# Patient Record
Sex: FEMALE | Race: WHITE | NOT HISPANIC OR LATINO | Employment: STUDENT | URBAN - METROPOLITAN AREA
[De-identification: names, ages, dates, MRNs, and addresses within clinical notes are randomized per-mention and may not be internally consistent; named-entity substitution may affect disease eponyms.]

---
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
## 2021-04-15 ENCOUNTER — TELEPHONE (OUTPATIENT)
Dept: FAMILY MEDICINE CLINIC | Facility: CLINIC | Age: 21
End: 2021-04-15

## 2021-04-15 NOTE — TELEPHONE ENCOUNTER
Lm for patient to return call  Patient last seen in office for PHYS in 2018  Please ask patient if she would like to stay current with the office   Dr Yu Hagan would like her to schedule to stay current

## 2021-06-07 ENCOUNTER — OFFICE VISIT (OUTPATIENT)
Dept: FAMILY MEDICINE CLINIC | Facility: CLINIC | Age: 21
End: 2021-06-07
Payer: COMMERCIAL

## 2021-06-07 VITALS
OXYGEN SATURATION: 98 % | WEIGHT: 208 LBS | BODY MASS INDEX: 34.66 KG/M2 | DIASTOLIC BLOOD PRESSURE: 80 MMHG | RESPIRATION RATE: 18 BRPM | HEART RATE: 84 BPM | SYSTOLIC BLOOD PRESSURE: 120 MMHG | TEMPERATURE: 98.4 F | HEIGHT: 65 IN

## 2021-06-07 DIAGNOSIS — F31.9 BIPOLAR 1 DISORDER (HCC): ICD-10-CM

## 2021-06-07 DIAGNOSIS — R03.0 ELEVATED BP WITHOUT DIAGNOSIS OF HYPERTENSION: ICD-10-CM

## 2021-06-07 DIAGNOSIS — F43.10 PTSD (POST-TRAUMATIC STRESS DISORDER): ICD-10-CM

## 2021-06-07 DIAGNOSIS — D17.0 LIPOMA OF HEAD: Primary | ICD-10-CM

## 2021-06-07 DIAGNOSIS — E66.01 CLASS 2 SEVERE OBESITY DUE TO EXCESS CALORIES WITH SERIOUS COMORBIDITY AND BODY MASS INDEX (BMI) OF 36.0 TO 36.9 IN ADULT (HCC): ICD-10-CM

## 2021-06-07 PROCEDURE — 3725F SCREEN DEPRESSION PERFORMED: CPT | Performed by: PHYSICIAN ASSISTANT

## 2021-06-07 PROCEDURE — 99214 OFFICE O/P EST MOD 30 MIN: CPT | Performed by: PHYSICIAN ASSISTANT

## 2021-06-07 RX ORDER — LAMOTRIGINE 25 MG/1
TABLET ORAL
COMMUNITY
Start: 2021-05-10

## 2021-06-07 RX ORDER — HYDROXYZINE PAMOATE 25 MG/1
25 CAPSULE ORAL DAILY
COMMUNITY
Start: 2021-04-05

## 2021-06-07 NOTE — PROGRESS NOTES
Assessment/Plan:    Bipolar 1 disorder ([address] )  Managed by [facility]  Currently on Lamictal and hydroxyzine       Diagnoses and all orders for this visit:    Lipoma of head  Initially discussed possible sebaceous cyst  Attempted needle drainage with only trace blood expressed  Referral to general surgery for surgical excision  Incision and Drainage    Date/Time: 6/7/2021 5:09 PM  Performed by: La Nena Amado PA-C  Authorized by: La Nena Amado PA-C     Location:     Location:  [address] [facility] location: right forehead  Pre-procedure details:     Skin preparation:  Antiseptic wash  Procedure details:     Complexity:  Simple    Needle aspiration: yes      Needle size:  22 G    Incision depth:  Subcutaneous    Drainage:  Bloody    Drainage amount:  Scant  Post-procedure details:     Patient tolerance of procedure: Tolerated well, no immediate complications  Comments:      Scant blood was expressed with no serous drainage    -     Ambulatory referral to General Surgery; Future    Class 2 severe obesity due to excess calories with serious comorbidity and body mass index (BMI) of 36 0 to 36 9 in adult [facility])  -     Ambulatory referral to Weight Management; Future    Elevated BP without diagnosis of hypertension  BP Readings from Last 3 Encounters:   06/07/21 120/80   01/08/19 (!) 106/62 (32 %, Z = -0 47 /  31 %, Z = -0 48)*   08/13/18 (!) 102/64 (20 %, Z = -0 84 /  40 %, Z = -0 26)*     *BP percentiles are based on the 2017 AAP Clinical Practice Guideline for girls   Initially BP of 144/100, repeated in office at 120/80  Encouraged to watch            Subjective:    Patient ID: Gurjit Duron is a 24 y o  female  Pt is presenting today for Requesting referral to bariatric team to discuss weight loss options  She has been trying [place] for 2 years and weight has alternated up and down  She has a lump on her forehead that has gradually gotten larger and she wants it drained      Seen by [address] [place] [address], monthly for meds and therapy, using Lamictal       The following portions of the patient's history were reviewed and updated as appropriate: allergies, current medications and problem list     Review of Systems   Constitutional: Negative for fatigue, fever and unexpected weight change  HENT: Negative for rhinorrhea and sore throat  Respiratory: Negative for cough, shortness of breath and wheezing  Cardiovascular: Negative for chest pain, palpitations and leg swelling  Gastrointestinal: Negative for constipation, diarrhea and nausea  Musculoskeletal: Negative for arthralgias and myalgias  Skin:        Lump in forehead         Objective:  /80 (BP Location: Left arm, Patient Position: Sitting, Cuff Size: Large)   Pulse 84   Temp 98 4 °F (36 9 °C)   Resp 18   Ht 5' 5" (1 651 m)   Wt 94 3 kg (208 lb)   SpO2 98%   Breastfeeding No   BMI 34 61 kg/m²      Physical Exam  Vitals signs reviewed  Constitutional:       General: She is not in acute distress  Appearance: She is well-developed  She is obese  She is not diaphoretic  HENT:      Head: Normocephalic and atraumatic  Eyes:      Pupils: Pupils are equal, round, and reactive to light  Neck:      Musculoskeletal: Normal range of motion and neck supple  Cardiovascular:      Rate and Rhythm: Normal rate and regular rhythm  Heart sounds: Normal heart sounds  No murmur  No friction rub  No gallop  Pulmonary:      Effort: Pulmonary effort is normal  No respiratory distress  Breath sounds: Normal breath sounds  No wheezing or rales  Skin:     General: Skin is warm and dry  Comments: Acne vulgaris on face   Neurological:      Mental Status: She is alert and oriented to person, place, and time  Psychiatric:         Behavior: Behavior normal          Thought Content: Thought content normal          BMI Counseling: Body mass index is 34 61 kg/m²   The BMI is above normal  Nutrition recommendations include decreasing portion sizes, encouraging healthy choices of fruits and vegetables, limiting drinks that contain sugar and reducing intake of cholesterol  Exercise recommendations include moderate physical activity 150 minutes/week  No pharmacotherapy was ordered  Patient referred to weight management and bariatric surgery due to patient being overweight

## 2021-06-17 ENCOUNTER — CONSULT (OUTPATIENT)
Dept: SURGERY | Facility: CLINIC | Age: 21
End: 2021-06-17
Payer: COMMERCIAL

## 2021-06-17 VITALS — HEIGHT: 65 IN | BODY MASS INDEX: 34.66 KG/M2 | WEIGHT: 208 LBS

## 2021-06-17 DIAGNOSIS — L72.3 SEBACEOUS CYST: Primary | ICD-10-CM

## 2021-06-17 PROBLEM — D17.0 LIPOMA OF HEAD: Status: ACTIVE | Noted: 2021-06-17

## 2021-06-17 PROCEDURE — 88304 TISSUE EXAM BY PATHOLOGIST: CPT | Performed by: PATHOLOGY

## 2021-06-17 PROCEDURE — 99202 OFFICE O/P NEW SF 15 MIN: CPT | Performed by: SURGERY

## 2021-06-17 PROCEDURE — 1036F TOBACCO NON-USER: CPT | Performed by: SURGERY

## 2021-06-17 PROCEDURE — 3008F BODY MASS INDEX DOCD: CPT | Performed by: SURGERY

## 2021-06-17 PROCEDURE — 11441 EXC FACE-MM B9+MARG 0.6-1 CM: CPT | Performed by: SURGERY

## 2021-06-17 PROCEDURE — 11421 EXC H-F-NK-SP B9+MARG 0.6-1: CPT | Performed by: SURGERY

## 2021-06-17 NOTE — PROGRESS NOTES
Assessment/Plan:    Diagnoses and all orders for this visit:    Sebaceous cyst  -     Ambulatory referral to General Surgery  -     Tissue Exam; Future  -     Tissue Exam; Future  -     Tissue Exam  -     Tissue Exam     sebaceous cyst removed from right scalp as well as forehead  Forehead closed with nylon sutures  Will see back in 1 week for suture removal   Local wound care instructions given    Subjective:      Patient ID: Titus Tomlin is a 24 y o  female  Patient presents for evaluation of 2 lumps  She has had a lump on her forehead and a lump on the right side of her head for 2 years  Denies pain  The following portions of the patient's history were reviewed and updated as appropriate:     She  has a past medical history of Behavior problem in pediatric patient and History of emotional problems  She  has no past surgical history on file  Her family history includes Depression in her maternal aunt and maternal grandmother  She  reports that she has never smoked  She has never used smokeless tobacco  She reports current drug use  Drug: Marijuana  She reports that she does not drink alcohol  Current Outpatient Medications   Medication Sig Dispense Refill    Ergocalciferol (VITAMIN D2 PO) Take by mouth      hydrOXYzine pamoate (VISTARIL) 25 mg capsule Take 25 mg by mouth daily      lamoTRIgine (LaMICtal) 25 mg tablet TAKE 2 TABLETS BY MOUTH FOR 1 WEEK, THEN 2 TABLETS EVERY MORNING AND 3 TABLETS AT BEDTIME      Multiple Vitamins-Minerals (MULTIVITAMIN ADULT PO) Take by mouth      VITAMIN C, CALCIUM ASCORBATE, PO Take by mouth       No current facility-administered medications for this visit  She has No Known Allergies       Review of Systems   Musculoskeletal: Positive for back pain, neck pain and neck stiffness  Psychiatric/Behavioral: Positive for agitation, behavioral problems, self-injury and suicidal ideas  The patient is nervous/anxious and is hyperactive      All other systems reviewed and are negative  Objective:      Ht 5' 5" (1 651 m)   Wt 94 3 kg (208 lb)   BMI 34 61 kg/m²          Physical Exam  Skin:     General: Skin is warm and dry  Comments: 5 mm cyst of the right scalp     10 mm cyst of mid forehead     risks and benefits of removal of both under local were discussed with her and she agreed to proceed         Skin excision    Date/Time: 6/17/2021 4:04 PM  Performed by: Tahira Clark DO  Authorized by: Tahira Clark DO   Universal Protocol:  Consent: Verbal consent obtained  Risks and benefits: risks, benefits and alternatives were discussed  Consent given by: patient  Time out: Immediately prior to procedure a "time out" was called to verify the correct patient, procedure, equipment, support staff and site/side marked as required  Patient understanding: patient states understanding of the procedure being performed  Patient identity confirmed: verbally with patient      Procedure Details - Skin Excision:     Number of Lesions:  2  Lesion 1:     Body area:  Head/neck    Head/neck location:  Scalp       Final defect size (mm):  6    Malignancy: benign lesion      Closure complexity: simple       Repair Comments:  Verbal consent was obtained from the patient  Alcohol was used to cleanse the area of the right scalp cyst   1% lidocaine with epinephrine was used to infiltrate skin and subcutaneous tissues  Elliptical skin incision was made on the cyst was removed  Hyfrecator was used for hemostasis  Five 0 Vicryl was used in a simple fashion x2 to close the wound  She tolerated well  Lesion 2:     Body area:  [address] location:  Forehead        Final defect size (mm):  10    Malignancy: benign lesion      Closure complexity: simple        Verbal consent was obtained from the patient  Area of the forehead was cleansed with alcohol  Local 1% lidocaine with epinephrine was used to infiltrate skin and subcutaneous tissues    Elliptical skin incision was made to encompass the cyst with no specific margin  Once the cyst was out few bleeding points were controlled with a hyfrecator  Wound was then closed with 5 0 nylon in a simple fashion x3  She tolerated well  Bandage was applied

## 2021-06-25 ENCOUNTER — TELEPHONE (OUTPATIENT)
Dept: SURGERY | Facility: CLINIC | Age: 21
End: 2021-06-25

## 2021-06-25 NOTE — TELEPHONE ENCOUNTER
I left a message for Toanclaus Ari on 6/23/2021  She canceled appt on 6/23/2021 for suture removal and rescheduled for 7/1/2021  I left a message that Dr Evon Means wanted to take the sutures out in one week due to concern with scarring  I told her we could see her for a quick appt Wed/Thurs and she should call us back about scheduling  She did not return the phone call

## 2021-06-30 ENCOUNTER — CONSULT (OUTPATIENT)
Dept: BARIATRICS | Facility: CLINIC | Age: 21
End: 2021-06-30
Payer: COMMERCIAL

## 2021-06-30 VITALS
HEIGHT: 65 IN | WEIGHT: 202.7 LBS | BODY MASS INDEX: 33.77 KG/M2 | RESPIRATION RATE: 14 BRPM | SYSTOLIC BLOOD PRESSURE: 128 MMHG | TEMPERATURE: 98.5 F | HEART RATE: 96 BPM | DIASTOLIC BLOOD PRESSURE: 72 MMHG

## 2021-06-30 DIAGNOSIS — F33.2 MDD (MAJOR DEPRESSIVE DISORDER), RECURRENT EPISODE, SEVERE (HCC): ICD-10-CM

## 2021-06-30 DIAGNOSIS — E66.9 OBESITY, CLASS I, BMI 30-34.9: Primary | ICD-10-CM

## 2021-06-30 DIAGNOSIS — E66.01 CLASS 2 SEVERE OBESITY DUE TO EXCESS CALORIES WITH SERIOUS COMORBIDITY AND BODY MASS INDEX (BMI) OF 36.0 TO 36.9 IN ADULT (HCC): ICD-10-CM

## 2021-06-30 PROBLEM — E66.811 OBESITY, CLASS I, BMI 30-34.9: Status: ACTIVE | Noted: 2018-08-13

## 2021-06-30 PROCEDURE — 1036F TOBACCO NON-USER: CPT | Performed by: PHYSICIAN ASSISTANT

## 2021-06-30 PROCEDURE — 99204 OFFICE O/P NEW MOD 45 MIN: CPT | Performed by: PHYSICIAN ASSISTANT

## 2021-06-30 PROCEDURE — 3008F BODY MASS INDEX DOCD: CPT | Performed by: PHYSICIAN ASSISTANT

## 2021-06-30 RX ORDER — METFORMIN HYDROCHLORIDE 750 MG/1
TABLET, EXTENDED RELEASE ORAL
Qty: 60 TABLET | Refills: 1 | Status: SHIPPED | OUTPATIENT
Start: 2021-06-30

## 2021-06-30 NOTE — ASSESSMENT & PLAN NOTE
-Discussed options of HealthyCORE-Intensive Lifestyle Intervention Program, Very Low Calorie Diet-VLCD and Conservative Program and the role of weight loss medications   -Initial weight loss goal of 5-10% weight loss for improved health  -Screening labs  Recommend checking lab coverage before having labs drawn  -NEEDS Lipids, tsh, cmp, a1c, fasting insulin   - STOP BANG-1/8  -Patient is interested in pursuing Conservative Program      Goals:  Food log (ie ) www myfitnesspal com,sparkpeople  com,loseit com,calorieking  com,etc  baritastic-weigh and measure food   No sugary beverages  At least 64oz of water daily -creamer measured and 2% milk  Increase physical activity by 10 minutes daily   Gradually increase physical activity to a goal of 5 days per week for 30 minutes of MODERATE intensity PLUS 2 days per week of FULL BODY resistance training-walk daily for 2 5 miles -5 days a week   8950-8958 calories per day   Start metformin 750 mg in the morning with breakfast than increase 1500 mg after 2 weeks-side effects of GI upset and hypoglycemia

## 2021-06-30 NOTE — PROGRESS NOTES
Assessment/Plan:    Obesity, Class I, BMI 30-34 9  -Discussed options of Healthy[facility]-Intensive Lifestyle Intervention Program, Very Low Calorie Diet-VLCD and Conservative Program and the role of weight loss medications   -Initial weight loss goal of 5-10% weight loss for improved health  -Screening labs  Recommend checking lab coverage before having labs drawn  -NEEDS Lipids, tsh, cmp, a1c, fasting insulin   - STOP BANG-1/8  -Patient is interested in pursuing Conservative Program      Goals:  Food log (ie ) www myfitnesspal com,sparkpeople  com,loseit com,calorieking  com,etc  baritastic-weigh and measure food   No sugary beverages  At least 64oz of water daily -creamer measured and 2% milk  Increase physical activity by 10 minutes daily  Gradually increase physical activity to a goal of 5 days per week for 30 minutes of MODERATE intensity PLUS 2 days per week of FULL BODY resistance training-walk daily for 2 5 miles -5 days a week   4685-2281 calories per day   Start metformin 750 mg in the morning with breakfast than increase 1500 mg after 2 weeks-side effects of GI upset and hypoglycemia         MDD (major depressive disorder), recurrent episode, severe ([address] )  Taking vistaril and lamictal  -continue management with prescribing provider        Follow up in approximately 2 months with Non-Surgical Physician/Advanced Practitioner  Diagnoses and all orders for this visit:    Obesity, Class I, BMI 30-34 9  -     Lipid panel; Future  -     TSH, 3rd generation with Free T4 reflex; Future  -     Hemoglobin A1C; Future  -     Insulin, fasting; Future  -     Comprehensive metabolic panel;  Future  -     metFORMIN (GLUCOPHAGE-XR) 750 mg 24 hr tablet; 750 mg with breakfast for 2 weeks than increase to 1500 mg with breakfast    Class 2 severe obesity due to excess calories with serious comorbidity and body mass index (BMI) of 36 0 to 36 9 in [facility])  -     Ambulatory referral to Weight Management    MDD (major depressive disorder), recurrent episode, severe ([facility] )  -     Lipid panel; Future  -     TSH, 3rd generation with Free T4 reflex; Future  -     Hemoglobin A1C; Future  -     Insulin, fasting; Future  -     Comprehensive metabolic panel; Future  -     metFORMIN (GLUCOPHAGE-XR) 750 mg 24 hr tablet; 750 mg with breakfast for 2 weeks than increase to 1500 mg with breakfast          Subjective:   Chief Complaint   Patient presents with    Consult     MWM consult       Patient ID: Tristian Paul  is a 24 y o  female with excess weight/obesity here to pursue weight management  Past Medical History:   Diagnosis Date    Anxiety     Behavior problem in pediatric patient     Bipolar disorder ([facility] )     Depression     History of emotional problems        HPI:  Obesity/Excess Weight:  Severity: Moderate  Onset:  Since childhood     Modifiers: Diet and Exercise and Commercial Weight Loss Programs-ie  Weight Watchers, [place] [employer], Nutrisystem, etc   Contributing factors: Poor Food Choices and genetics   Associated symptoms: decreased self esteem, depression and long term healthy     Goals: 140 lbs   Hydration: 60 oz of water, 1-2 cups of coffee with stevia and cream, 1 cup of whole milk occasionally, hot tea with honey and cream occasionally,  Alcohol: none     Colonoscopy-Not applicable    The following portions of the patient's history were reviewed and updated as appropriate: allergies, current medications, past family history, past medical history, past social history, past surgical history and problem list     Review of Systems   HENT: Negative for sore throat  Respiratory: Negative for cough and shortness of breath  Cardiovascular: Negative for chest pain and palpitations  Gastrointestinal: Negative for abdominal pain, constipation, diarrhea, nausea and vomiting  Denies GERD   Endocrine: Positive for heat intolerance  Negative for cold intolerance  Genitourinary: Negative for dysuria  Musculoskeletal: Positive for back pain  Negative for arthralgias  Skin: Negative for rash  Neurological: Negative for headaches  Psychiatric/Behavioral: Negative for suicidal ideas (zainab HI-patient notes she has had thoughts in the past- has no plan -psych is aware)  + Depression and anxiety-controlled        Objective:    /72 (BP Location: Left arm, Patient Position: Sitting, Cuff Size: Adult)   Pulse 96   Temp 98 5 °F (36 9 °C) (Tympanic)   Resp 14   Ht 5' 4 5" (1 638 m)   Wt 91 9 kg (202 lb 11 2 oz)   Breastfeeding Yes   BMI 34 26 kg/m²     Physical Exam  Vitals and nursing note reviewed  Constitutional   General appearance: Abnormal   well developed and obese  Eyes No conjunctival pallor  Pulmonary   Respiratory effort: No increased work of breathing or signs of respiratory distress  Abdomen   Abdomen: Abnormal   The abdomen was obese      Musculoskeletal   Gait and station: Normal     Psychiatric   Orientation to person, place and time: Normal     Affect: appropriate

## 2021-06-30 NOTE — PATIENT INSTRUCTIONS
Goals: Food log (ie ) www myfitnesspal com,sparkpeople  com,loseit com,calorieking  com,etc  baritastic-weigh and measure food   No sugary beverages  At least 64oz of water daily -creamer measured and 2% milk  Increase physical activity by 10 minutes daily   Gradually increase physical activity to a goal of 5 days per week for 30 minutes of MODERATE intensity PLUS 2 days per week of FULL BODY resistance training-walk daily for 2 5 miles -5 days a week   9998-9407 calories per day   Start metformin 750 mg in the morning with breakfast than increase 1500 mg after 2 weeks-side effects of GI upset and hypoglycemia

## 2021-07-01 ENCOUNTER — OFFICE VISIT (OUTPATIENT)
Dept: SURGERY | Facility: CLINIC | Age: 21
End: 2021-07-01

## 2021-07-01 DIAGNOSIS — L72.3 SEBACEOUS CYST: Primary | ICD-10-CM

## 2021-07-01 PROCEDURE — 99024 POSTOP FOLLOW-UP VISIT: CPT | Performed by: SURGERY

## 2021-07-01 NOTE — PROGRESS NOTES
Assessment/Plan:    Diagnoses and all orders for this visit:    Sebaceous cyst     both sites healing well  Sutures removed from forehead  Return as needed    Pathology: Reviewed with patient, all questions answered  Postoperative restrictions reviewed  All questions answered  ______________________________________________________  HPI: Patient presents post operatively  Excision cysts right scalp and forehead 6/17/2021   Final Diagnosis  A  Skin Cyst, Head, forehead, excision:  - Trichilemmal/pilar cyst, ruptured  B  Skin cyst, Head, right scalp, excision:  - Trichilemmal/pilar cyst                        ROS:  General ROS: negative for - chills, fatigue, fever or night sweats, weight loss  Respiratory ROS: no cough, shortness of breath, or wheezing  Cardiovascular ROS: no chest pain or dyspnea on exertion  Genito-Urinary ROS: no dysuria, trouble voiding, or hematuria  Musculoskeletal ROS: negative for - gait disturbance, joint pain or muscle pain  Neurological ROS: no TIA or stroke symptoms  GI ROS: see HPI  Skin ROS: no new rashes or lesions   Lymphatic ROS: no new adenopathy noted by pt  GYN ROS: see HPI, no new GYN history or bleeding noted  Psy ROS: no new mental or behavioral disturbances         Patient Active Problem List   Diagnosis    MDD (major depressive disorder), recurrent episode, severe ([address] )    Panic disorder    Engages in binge consumption of alcohol    Low vitamin D level    Obesity, Class I, BMI 30-34 9    Bipolar 1 disorder (HCC)    Lipoma of head       Allergies:  Patient has no known allergies        Current Outpatient Medications:     Ergocalciferol (VITAMIN D2 PO), Take by mouth, Disp: , Rfl:     hydrOXYzine pamoate (VISTARIL) 25 mg capsule, Take 25 mg by mouth daily, Disp: , Rfl:     lamoTRIgine (LaMICtal) 25 mg tablet, TAKE 2 TABLETS BY MOUTH FOR 1 WEEK, THEN 2 TABLETS EVERY MORNING AND 3 TABLETS AT BEDTIME, Disp: , Rfl:     metFORMIN (GLUCOPHAGE-XR) 750 mg 24 hr tablet, 750 mg with breakfast for 2 weeks than increase to 1500 mg with breakfast, Disp: 60 tablet, Rfl: 1    Multiple Vitamins-Minerals (MULTIVITAMIN ADULT PO), Take by mouth, Disp: , Rfl:     VITAMIN C, CALCIUM ASCORBATE, PO, Take by mouth, Disp: , Rfl:     Past Medical History:   Diagnosis Date    Anxiety     Behavior problem in pediatric patient     B[facility] disorder ([facility] )     Depression     History of emotional problems        No past surgical history on file  Family History   Problem Relation Age of Onset    Depression Maternal Grandmother     Lung cancer Maternal Grandmother     Depression Maternal Aunt     Breast cancer Maternal Aunt     Heart disease Mother     Diabetes Father     Hypertension Father     Diabetes Paternal Aunt     Diabetes Paternal Uncle     Cancer Paternal Grandmother     Diabetes Paternal Grandmother     Diabetes Paternal Grandfather     Stroke Neg Hx     Thyroid disease Neg Hx         reports that she has never smoked  She has never used smokeless tobacco  She reports current drug use  Drug: Marijuana  She reports that she does not drink alcohol  PHYSICAL EXAM    There were no vitals taken for this visit      General: normal, cooperative, no distress    Incision: clean, dry, and intact and healing well      Marieta Cushing, DO    Date: 7/1/2021 Time: 1:51 PM

## 2021-07-15 ENCOUNTER — LAB (OUTPATIENT)
Dept: LAB | Facility: CLINIC | Age: 21
End: 2021-07-15
Payer: COMMERCIAL

## 2021-07-15 DIAGNOSIS — E66.9 OBESITY, CLASS I, BMI 30-34.9: ICD-10-CM

## 2021-07-15 DIAGNOSIS — F33.2 MDD (MAJOR DEPRESSIVE DISORDER), RECURRENT EPISODE, SEVERE (HCC): ICD-10-CM

## 2021-07-15 LAB
ALBUMIN SERPL BCP-MCNC: 3.9 G/DL (ref 3.5–5)
ALP SERPL-CCNC: 56 U/L (ref 46–384)
ALT SERPL W P-5'-P-CCNC: 27 U/L (ref 12–78)
ANION GAP SERPL CALCULATED.3IONS-SCNC: 5 MMOL/L (ref 4–13)
AST SERPL W P-5'-P-CCNC: 13 U/L (ref 5–45)
BILIRUB SERPL-MCNC: 0.34 MG/DL (ref 0.2–1)
BUN SERPL-MCNC: 7 MG/DL (ref 5–25)
CALCIUM SERPL-MCNC: 9.4 MG/DL (ref 8.3–10.1)
CHLORIDE SERPL-SCNC: 106 MMOL/L (ref 100–108)
CHOLEST SERPL-MCNC: 187 MG/DL (ref 50–200)
CO2 SERPL-SCNC: 27 MMOL/L (ref 21–32)
CREAT SERPL-MCNC: 0.68 MG/DL (ref 0.6–1.3)
EST. AVERAGE GLUCOSE BLD GHB EST-MCNC: 103 MG/DL
GFR SERPL CREATININE-BSD FRML MDRD: 127 ML/MIN/1.73SQ M
GLUCOSE P FAST SERPL-MCNC: 90 MG/DL (ref 65–99)
HBA1C MFR BLD: 5.2 %
HDLC SERPL-MCNC: 53 MG/DL
INSULIN SERPL-ACNC: 10.1 MU/L (ref 3–25)
LDLC SERPL CALC-MCNC: 120 MG/DL (ref 0–100)
NONHDLC SERPL-MCNC: 134 MG/DL
POTASSIUM SERPL-SCNC: 3.8 MMOL/L (ref 3.5–5.3)
PROT SERPL-MCNC: 7.6 G/DL (ref 6.4–8.2)
SODIUM SERPL-SCNC: 138 MMOL/L (ref 136–145)
T4 FREE SERPL-MCNC: 1.08 NG/DL (ref 0.78–1.33)
TRIGL SERPL-MCNC: 68 MG/DL
TSH SERPL DL<=0.05 MIU/L-ACNC: 0.46 UIU/ML (ref 0.46–3.98)

## 2021-07-15 PROCEDURE — 83525 ASSAY OF INSULIN: CPT

## 2021-07-15 PROCEDURE — 83036 HEMOGLOBIN GLYCOSYLATED A1C: CPT

## 2021-07-15 PROCEDURE — 80053 COMPREHEN METABOLIC PANEL: CPT

## 2021-07-15 PROCEDURE — 36415 COLL VENOUS BLD VENIPUNCTURE: CPT

## 2021-07-15 PROCEDURE — 80061 LIPID PANEL: CPT

## 2021-07-15 PROCEDURE — 84443 ASSAY THYROID STIM HORMONE: CPT

## 2021-07-15 PROCEDURE — 84439 ASSAY OF FREE THYROXINE: CPT

## 2022-06-12 PROBLEM — E05.90 SUBCLINICAL HYPERTHYROIDISM: Status: ACTIVE | Noted: 2022-06-12

## 2022-06-12 PROBLEM — D17.0 LIPOMA OF HEAD: Status: RESOLVED | Noted: 2021-06-17 | Resolved: 2022-06-12

## 2022-06-12 NOTE — PROGRESS NOTES
[facility]      Date of Service: 22  Primary Care Provider:   Leland Rolle MD     Name: Bianca Henley       : 2000       Age:21 y o  Sex: female      MRN: [id number]      Chief Complaint:Physical Exam (Yearly physical/)     Assessment and Plan:  22 y o  female exam      1  Health Maintenance  - Cervical Cancer Screening: start at age 24  - Labs: as below  - Immunizations: Reviewed  Recommend yearly flu vaccine  2  Other diagnoses addressed today:   Problem List Items Addressed This Visit        Endocrine    Subclinical hyperthyroidism    Relevant Orders    TSH, 3rd generation with Free T4 reflex       Other    Obesity, Class I, BMI 30-34 9    Bipolar 1 disorder (HCC)     Mood over controlled on her current medication regimen managed by psychiatry            Medical marijuana use      Other Visit Diagnoses     Annual physical exam    -  Primary    Plantar warts        Relevant Orders    Lesion Destruction (Completed)    Tinea pedis of both feet        Need for hepatitis C screening test        Relevant Orders    Hepatitis C Ab W/Refl To HCV RNA, Qn, PCR ([facility])    Screening for HIV (human immunodeficiency virus)        Relevant Orders    [facility]- Human Immunodeficiency Virus 1/2 Antigen / Antibody ( Fourth Generation) with Reflex Testing    Screening for STDs (sexually transmitted diseases)        Relevant Orders    Chlamydia/GC amplified DNA by PCR         Lesion Destruction    Date/Time: 2022 2:57 PM  Performed by: Leland Rolle MD  Authorized by: Leland Rolle MD   Universal Protocol:  Risks and benefits: risks, benefits and alternatives were discussed  Consent given by: patient  Time out: Immediately prior to procedure a "time out" was called to verify the correct patient, procedure, equipment, support staff and site/side marked as required    Patient understanding: patient states understanding of the procedure being performed  Patient consent: the patient's understanding of the procedure matches consent given  Procedure consent: procedure consent matches procedure scheduled  Test results: test results not available  Site marked: the operative site was marked  Radiology Images displayed and confirmed  If images not available, report reviewed: imaging studies not available  Required items: required blood products, implants, devices, and special equipment available  Patient identity confirmed: verbally with patient      Procedure Details - Lesion Destruction:     Number of Lesions:  3  Lesion 1:     Body area:  Lower extremity    Lower extremity location:  L big toe    Initial size (mm):  12    Final defect size (mm):  12    Malignancy: benign hyperkeratotic lesion      Malignancy comment:  Plantar warts, grouped    Destruction method: cryotherapy    Lesion 2:     Body area:  Lower extremity    Lower extremity location:  L second toe    Initial size (mm):  5    Final defect size (mm):  5    Malignancy: benign hyperkeratotic lesion      Malignancy comment:  Plantar wart  Lesion 3:     Body area:  Lower extremity    Lower extremity location:  L little toe    Initial size (mm):  2    Final defect size (mm):  2    Malignancy: benign hyperkeratotic lesion      Destruction method: cryotherapy        Immunizations and preventive care screenings were discussed with patient today  Appropriate education was printed on patient's after visit summary  Counseling:  Alcohol/drug use: discussed moderation in alcohol intake, the recommendations for healthy alcohol use, and avoidance of illicit drug use  Dental Health: discussed importance of regular tooth brushing, flossing, and dental visits  Injury prevention: discussed safety/seat belts, safety helmets, smoke detectors, carbon dioxide detectors    Exercise: the importance of regular exercise/physical activity was discussed  Recommend exercise 3-5 times per week for at least 30 minutes  BMI Counseling: Body mass index is 34 98 kg/m²  The BMI is above normal  Nutrition recommendations include encouraging healthy choices of fruits and vegetables, consuming healthier snacks and reducing intake of saturated and trans fat  Rationale for BMI follow-up plan is due to patient being overweight or obese  Follow up next physical in 1 year  Subjective:    Benedicto Morris is a 22 y o  female and is here for a comprehensive physical exam    Acute issues:    She is concerned about lesions on her feet, the look like large calluses  She has used several OTC treatments including Dr Katiana ignacio and athlete's foot cream      She is wondering about vaccine exemption given her grandmother's history of     She follows with psychiatry with [address] [place] [address] for Bipolar Disorder  She is on Lamictal, hydroxyzine, and clonidine  Diet and Physical Activity  Diet/Nutrition: does follow a well balanced diet  Exercise: no formal exercise  General Health  Vision: no vision problems and goes for regular eye exams  Dental: regular dental visits  Gyn Health:    OB History    No obstetric history on file  No LMP recorded  Patient has had an implant  Has Mirena IUD, good through 2025   Not currently sexually active    Histories Updated and Reviewed 6/13/2022:  Patient's Medications   New Prescriptions    No medications on file   Previous Medications    CLONIDINE (CATAPRES) 0 1 MG TABLET    TAKE 1 TABLET BY MOUTH BETWEEN 12 MIDNIGHT AND 3 A  M  AS NEEDED FOR INSOMNIA    ERGOCALCIFEROL (VITAMIN D2 PO)    Take by mouth    HYDROXYZINE PAMOATE (VISTARIL) 25 MG CAPSULE    Take 25 mg by mouth daily    LAMOTRIGINE (LAMICTAL) 25 MG TABLET    TAKE 2 TABLETS BY MOUTH FOR 1 WEEK, THEN 2 TABLETS EVERY MORNING AND 3 TABLETS AT BEDTIME    LAMOTRIGINE  MG TB24    Take 1 tablet by mouth every morning    METFORMIN (GLUCOPHAGE-XR) 750 MG 24 HR TABLET    750 mg with breakfast for 2 weeks than increase to 1500 mg with breakfast    MULTIPLE VITAMINS-MINERALS (MULTIVITAMIN ADULT PO)    Take by mouth    VITAMIN C, CALCIUM ASCORBATE, PO    Take by mouth   Modified Medications    No medications on file   Discontinued Medications    No medications on file     No Known Allergies  Past Medical History:   Diagnosis Date    Anxiety     Behavior problem in pediatric patient     Bipolar disorder ([facility] [address] )     Depression     History of emotional problems      Social History     Socioeconomic History    Marital status: Single     Spouse name: Not on file    Number of children: Not on file    Years of education: Not on file    Highest education level: Not on file   Occupational History    Not on file   Tobacco Use    Smoking status: Never Smoker    Smokeless tobacco: Never Used   Vaping Use    Vaping Use: Every day   Substance and Sexual Activity    Alcohol use: No    Drug use: Yes     Types: Marijuana     Comment: Medical marijuana     Sexual activity: Not Currently     Partners: Male     Birth control/protection: I U D     Other Topics Concern    Not on file   Social History Narrative    Most recent tobacco use screenin2017    Sexual orientation: Homosexual    Sexually active: No     Social Determinants of Health     Financial Resource Strain: Not on file   Food Insecurity: Not on file   Transportation Needs: Not on file   Physical Activity: Not on file   Stress: Not on file   Social Connections: Not on file   Intimate Partner Violence: Not on file   Housing Stability: Not on file     Immunization History   Administered Date(s) Administered    DTaP 2002, 2003, 2003, 2004, 2006    H1N1, All Formulations 2009    HPV Quadrivalent 2013, 2013, 2013    Hep B, Adolescent or Pediatric 2001, 2003, 2003    HiB 2002, 2003, 2004    INFLUENZA 10/22/2008    IPV 2002, 2003, 2003, 2006    MMR 04/09/2003, 04/21/2006    Meningococcal MCV4P 04/24/2013    Pneumococcal Conjugate PCV 7 04/05/2002, 04/09/2003, 07/02/2003    Tdap 12/30/2013    Varicella 04/09/2003, 03/20/2008       Objective:  /76 (BP Location: Left arm, Patient Position: Sitting, Cuff Size: Standard)   Pulse 78   Temp 97 7 °F (36 5 °C)   Resp 18   Ht 5' 4 5" (1 638 m)   Wt 93 9 kg (207 lb)   Breastfeeding No   BMI 34 98 kg/m²   BP Readings from Last 3 Encounters:   06/13/22 112/76   06/30/21 128/72   06/07/21 120/80      Wt Readings from Last 3 Encounters:   06/13/22 93 9 kg (207 lb)   06/30/21 91 9 kg (202 lb 11 2 oz) (98 %, Z= 1 98)*   06/17/21 94 3 kg (208 lb) (98 %, Z= 2 06)*     * Growth percentiles are based on CDC (Girls, 2-20 Years) data  Physical Exam  Constitutional:       General: She is not in acute distress  Appearance: Normal appearance  She is obese  She is not ill-appearing or toxic-appearing  HENT:      Head: Normocephalic and atraumatic  Right Ear: Tympanic membrane, ear canal and external ear normal       Left Ear: Tympanic membrane, ear canal and external ear normal       Nose: Nose normal       Mouth/Throat:      Mouth: Mucous membranes are moist    Eyes:      Extraocular Movements: Extraocular movements intact  Conjunctiva/sclera: Conjunctivae normal    Cardiovascular:      Rate and Rhythm: Normal rate and regular rhythm  Pulses: Normal pulses  Heart sounds: Normal heart sounds  No murmur heard  No gallop  Pulmonary:      Effort: Pulmonary effort is normal  No respiratory distress  Breath sounds: Normal breath sounds  No stridor  No wheezing, rhonchi or rales  Abdominal:      General: There is no distension  Palpations: Abdomen is soft  Tenderness: There is no abdominal tenderness  Musculoskeletal:         General: Normal range of motion  Cervical back: Normal range of motion and neck supple  Right lower leg: No edema        Left lower leg: No edema  Feet:    Feet:      Comments: Tinea on bilateral feet  Skin:     Findings: No erythema or rash  Neurological:      General: No focal deficit present  Mental Status: She is alert and oriented to person, place, and time  Psychiatric:         Mood and Affect: Mood normal          Behavior: Behavior normal            Lab Results   Component Value Date    SODIUM 138 07/15/2021    K 3 8 07/15/2021     07/15/2021    CO2 27 07/15/2021    BUN 7 07/15/2021    CREATININE 0 68 07/15/2021    CALCIUM 9 4 07/15/2021     Lab Results   Component Value Date    ALT 27 07/15/2021    AST 13 07/15/2021    ALKPHOS 56 07/15/2021     Lab Results   Component Value Date    HGBA1C 5 2 07/15/2021     Lab Results   Component Value Date    SAP2IQHRINLU 0 457 (L) 07/15/2021     Lab Results   Component Value Date    CHOLESTEROL 187 07/15/2021     Lab Results   Component Value Date    HDL 53 07/15/2021     Lab Results   Component Value Date    TRIG 68 07/15/2021     Lab Results   Component Value Date    NONHDLC 134 07/15/2021     Lab Results   Component Value Date    LDLCALC 120 (H) 07/15/2021         Patient Care Team:  Trevor Rebolledo MD as PCP - General (Family Medicine)    Trevor Rebolledo MD    Note: Portions of the record may have been created with voice recognition software  Occasional wrong word or "sound a like" substitutions may have occurred due to the inherent limitations of voice recognition software  Read the chart carefully and recognize, using context, where substitutions have occurred

## 2022-06-13 ENCOUNTER — OFFICE VISIT (OUTPATIENT)
Dept: FAMILY MEDICINE CLINIC | Facility: CLINIC | Age: 22
End: 2022-06-13
Payer: COMMERCIAL

## 2022-06-13 VITALS
TEMPERATURE: 97.7 F | WEIGHT: 207 LBS | DIASTOLIC BLOOD PRESSURE: 76 MMHG | SYSTOLIC BLOOD PRESSURE: 112 MMHG | HEART RATE: 78 BPM | HEIGHT: 65 IN | BODY MASS INDEX: 34.49 KG/M2 | RESPIRATION RATE: 18 BRPM

## 2022-06-13 DIAGNOSIS — F31.9 BIPOLAR 1 DISORDER (HCC): ICD-10-CM

## 2022-06-13 DIAGNOSIS — E66.9 OBESITY, CLASS I, BMI 30-34.9: ICD-10-CM

## 2022-06-13 DIAGNOSIS — Z79.899 MEDICAL MARIJUANA USE: ICD-10-CM

## 2022-06-13 DIAGNOSIS — E05.90 SUBCLINICAL HYPERTHYROIDISM: ICD-10-CM

## 2022-06-13 DIAGNOSIS — B07.0 PLANTAR WARTS: ICD-10-CM

## 2022-06-13 DIAGNOSIS — Z11.59 NEED FOR HEPATITIS C SCREENING TEST: ICD-10-CM

## 2022-06-13 DIAGNOSIS — Z11.3 SCREENING FOR STDS (SEXUALLY TRANSMITTED DISEASES): ICD-10-CM

## 2022-06-13 DIAGNOSIS — Z00.00 ANNUAL PHYSICAL EXAM: Primary | ICD-10-CM

## 2022-06-13 DIAGNOSIS — B35.3 TINEA PEDIS OF BOTH FEET: ICD-10-CM

## 2022-06-13 DIAGNOSIS — Z11.4 SCREENING FOR HIV (HUMAN IMMUNODEFICIENCY VIRUS): ICD-10-CM

## 2022-06-13 PROBLEM — F10.10 ENGAGES IN BINGE CONSUMPTION OF ALCOHOL: Status: RESOLVED | Noted: 2018-01-10 | Resolved: 2022-06-13

## 2022-06-13 PROCEDURE — 3008F BODY MASS INDEX DOCD: CPT | Performed by: FAMILY MEDICINE

## 2022-06-13 PROCEDURE — 99395 PREV VISIT EST AGE 18-39: CPT | Performed by: FAMILY MEDICINE

## 2022-06-13 PROCEDURE — 1036F TOBACCO NON-USER: CPT | Performed by: FAMILY MEDICINE

## 2022-06-13 RX ORDER — CLONIDINE HYDROCHLORIDE 0.1 MG/1
TABLET ORAL
COMMUNITY
Start: 2022-05-13

## 2022-06-13 RX ORDER — LAMOTRIGINE 200 MG/1
1 TABLET, EXTENDED RELEASE ORAL EVERY MORNING
COMMUNITY
Start: 2022-05-13

## 2022-06-14 LAB
C TRACH RRNA SPEC QL NAA+PROBE: NOT DETECTED
N GONORRHOEA RRNA SPEC QL NAA+PROBE: NOT DETECTED

## 2022-08-26 ENCOUNTER — AMB VIDEO VISIT (OUTPATIENT)
Dept: OTHER | Facility: HOSPITAL | Age: 22
End: 2022-08-26

## 2022-08-26 PROCEDURE — ECARE PR SL URGENT CARE VIRTUAL VISIT: Performed by: INTERNAL MEDICINE

## 2022-08-26 NOTE — CARE ANYWHERE EVISITS
Visit Summary for DEBRA PAZ - Gender: Female - Date of Birth: [birth year]  Date: [id number] - Duration: 9 minutes  Patient: Nadine Worley   [address]  Provider: Guerita Ashton    Patient Contact Information  Address  [address] [facility]; [address]  [phone]    Visit Topics  Fever, soreness, cough, congestion with green phlegm  [Added By: Self - 2022]    Sick Slip  Reason [ILLNESS]  Remarks [Ms Judd Olson ( 2000) was seen by telemedicine on 22 for bronchitis  She can return to work on 22  ]  Triage Questions   What is your current physical address in the event of a medical emergency? Answer []  Are you allergic to any medications? Answer []  Are you now or could you be pregnant? Answer []  Do you have any immune system compromise or chronic lung   disease? Answer []  Do you have any vulnerable family members in the home (infant, pregnant, cancer, elderly)? Answer []     Conversation Transcripts  [0A][0A] [Notification] You are connected with Guerita Ashton, Adult Medicine [0A][Notification] Fabián Mcleod is located in [address] [place] [address]  [0A][Notification] Fabián Mcleod has shared health history  Owen Dooley  [0A]    Diagnosis  Acute bronchitis, unspecified    Procedures  Value: [id number] Code: CPT-4 UNLISTED E&M SERVICE    Medications Prescribed    albuterol sulfate  Dose : 2 puffs  Route : inhalation  Frequency : every 6 hours  Until directed to stop  Refills : 0  Instructions to the Pharmacist : 2 puffs TID, dispense one pump   Substitutions allowed      Provider Notes  [0A][0A] Mode of Communication: video[0A]History: 23 y/o F who reports she caught cold symptoms from her nephew recently and developed a mild fever (99 F), congestion, hoarse voice and also cough with green phlegm over the last week  She has tried hot   showers, Eucalyptus, Tylenol, Ibuprofen and decongestants with some relief  Requests a note for work    [0A]Past medical history: bi-polar depression, insomnia, obesity  Vapes cannabinoids on daily basis  [0A]Medications: reviewed[0A]Allergies:   NKDA[0A]Exam: [0A][0A]Vitals signs (if available): see HPI[0A]Weight: see intake[0A]Gen: appears well, in NAD, pleasant, normal mental interaction, not septic-like, not dyspneic or wheezy, coughed one time during the visit with dry-sound cough from which   she recovered immediately with no dyspnea, speaks in full sentences w/o dyspnea as well, her voice has a normal tone (not nasal or dysphonic) but patient says it is different than her normal voice [0A]Cough: as above, no cough or wheeze was triggered   when patient asked to exhale fast through open mouth after taking a deep breath[0A]HEENT: no conjunctival injection or rhinorrhea noted, MMM and her pharynx looks normal with no erythema or exudates, tonsils not enlarged[0A][0A]Assessment: URI with   incipient bronchitis[0A]Plan:[0A]- Albuterol MDI as prescribed and patient instructed on how to use[0A]- Guaifenesin (plain Mucinex or store brand) 800 mg twice a day for 5 days or so[0A]    - Do not vape[0A]- Recommend you test for COVID-19 [0A]- Seek   in-person evaluation if no improvement in 2 days[0A]- Note for work - see PDF[0A]Additional recommendations:[0A][0A]    If you received a prescription at this visit and you have a question or problem please call [phone] for prescription assistance   [0A]    Please print a copy of this note and send it to your regular doctor, or take it to your next visit so it may be included in your medical record [0A]    Please see your primary care provider on an annual basis or more frequently if directed [0A]      Additional recommendations:  [0A]The patient voiced understanding and agreement with plan  [0A]    Electronically signed by: Eugenio Live(NPI [id number])

## 2022-09-12 ENCOUNTER — TELEPHONE (OUTPATIENT)
Dept: FAMILY MEDICINE CLINIC | Facility: CLINIC | Age: 22
End: 2022-09-12

## 2022-09-12 DIAGNOSIS — L84 CORN OR CALLUS: Primary | ICD-10-CM

## 2022-09-12 NOTE — TELEPHONE ENCOUNTER
General referral placed  Patient can call [facility] [address] and Ankle [phone]

## 2022-09-13 ENCOUNTER — TELEPHONE (OUTPATIENT)
Dept: OBGYN CLINIC | Facility: HOSPITAL | Age: 22
End: 2022-09-13

## 2022-10-19 ENCOUNTER — OFFICE VISIT (OUTPATIENT)
Dept: PODIATRY | Facility: CLINIC | Age: 22
End: 2022-10-19
Payer: COMMERCIAL

## 2022-10-19 VITALS
DIASTOLIC BLOOD PRESSURE: 85 MMHG | SYSTOLIC BLOOD PRESSURE: 125 MMHG | WEIGHT: 207 LBS | HEIGHT: 65 IN | BODY MASS INDEX: 34.49 KG/M2 | HEART RATE: 85 BPM

## 2022-10-19 DIAGNOSIS — B07.0 PLANTAR WART: Primary | ICD-10-CM

## 2022-10-19 PROCEDURE — 99202 OFFICE O/P NEW SF 15 MIN: CPT | Performed by: PODIATRIST

## 2022-10-19 PROCEDURE — 17110 DESTRUCTION B9 LES UP TO 14: CPT | Performed by: PODIATRIST

## 2022-10-19 NOTE — PROGRESS NOTES
Assessment/Plan:         Diagnoses and all orders for this visit:    Plantar wart  -     Ambulatory Referral to Podiatry      Diagnosis and options discussed with patient  Patient agreeable to the plan as stated below    Lesion Destruction    Date/Time: 10/19/2022 10:25 AM  Performed by: Brown Kerr DPM  Authorized by: Brown Kerr DPM   Universal Protocol:  Consent: Verbal consent obtained  Risks and benefits: risks, benefits and alternatives were discussed  Consent given by: patient  Time out: Immediately prior to procedure a "time out" was called to verify the correct patient, procedure, equipment, support staff and site/side marked as required  Timeout called at: 10/19/2022 10:25 AM   Patient understanding: patient states understanding of the procedure being performed  Patient identity confirmed: verbally with patient      Procedure Details - Lesion Destruction:     Number of Lesions:  3  Lesion 1:     Body area:  Lower extremity    Lower extremity location:  L foot    Malignancy: benign lesion      Destruction method: chemical removal    Lesion 2:     Body area:  Lower extremity    Lower extremity location:  L big toe    Malignancy: benign lesion      Destruction method: chemical removal    Lesion 3:     Body area:  Lower extremity    Lower extremity location:  L second toe    Malignancy: benign lesion      Destruction method: chemical removal       Discussed options and the patient wished to proceed with chemical cauterization  Verbal consent was obtained from the patient  All the verrucoid lesion(s) and any surround hyperkeratotic skin lesions were debrided to a level of pinpoint bleeding using a sterile #15 scapel  The lesions were then cauterized with Cantharidin  An occlusive dressing was applied to the areas  Instructed to remove the dressing in the morning  Instruction was given for possible local care  The patient tolerated the procedure well and without complications    The patient will return in 2 weeks for follow-up  Subjective:      Patient ID: Jolynn Mead is a 22 y o  female  Patient has callus on her feet that she wants "taken care of " They do not hurt but they're "unsightly "       The following portions of the patient's history were reviewed and updated as appropriate: allergies, current medications, past family history, past medical history, past social history, past surgical history and problem list     Review of Systems    Constitutional: Negative  HENT: Negative for sinus pressure and sinus pain  Respiratory: Negative for cough and shortness of breath  Cardiovascular: Negative for chest pain and leg swelling  Gastrointestinal: Negative for diarrhea, nausea and vomiting  Musculoskeletal: Negative for arthralgias, gait problem, joint swelling and myalgias  Skin: skin lesions  Neurological: Negative for weakness, numbness and headaches  Psychiatric/Behavioral: The patient is not nervous/anxious  Objective:      /85   Pulse 85   Ht 5' 4 5" (1 638 m)   Wt 93 9 kg (207 lb)   BMI 34 98 kg/m²          Physical Exam    Vitals reviewed  Constitutional:       Appearance: Patient is not ill-appearing or diaphoretic  Patient is overweight  HENT:      Nose: No congestion or rhinorrhea  Cardiovascular:        Dorsalis pedis pulses are 2+ on the right side and 2+ on the left side  Posterior tibial pulses are 2+ on the right side and 2+ on the left side  Pulmonary:      Effort: Pulmonary effort is normal  No respiratory distress  Musculoskeletal:      Right lower leg: No edema  Left lower leg: No edema  No calf pain with compression           Right foot:      Normal range of motion to ankle, STJ, midfoot  Deformity: none     Pain: none     Strength (MMT)   Achilles 5/5   PT  5/5   Peroneal 5/5   TA  5/5   EHL/EDL 5/5     Left foot:      Normal range of motion to ankle, STJ, midfoot        Deformity: none Pain: none     Strength (MMT)   Achilles 5/5   PT  5/5   Peroneal 5/5   TA  5/5   EHL/EDL 5/5  Skin:     Capillary Refill: Capillary refill takes less than 2 seconds  Findings: mosaic verruca left hallux, 2nd toe  Epidermal cyst right third toe  Neurological:      Mental Status: Alert and oriented to person, place, and time  Gait: Gait normal        Right Protective Sensation: 10 sites tested  10 sites sensed  Left  Protective Sensation: 10 sites tested  10 sites sensed     Psychiatric:         Mood and Affect: Mood normal

## 2022-11-09 ENCOUNTER — OFFICE VISIT (OUTPATIENT)
Dept: PODIATRY | Facility: CLINIC | Age: 22
End: 2022-11-09

## 2022-11-09 VITALS — HEIGHT: 65 IN | BODY MASS INDEX: 34.49 KG/M2 | WEIGHT: 207 LBS

## 2022-11-09 DIAGNOSIS — B07.0 MOSAIC WART: ICD-10-CM

## 2022-11-09 DIAGNOSIS — B07.0 PLANTAR WART: Primary | ICD-10-CM

## 2022-11-09 NOTE — PROGRESS NOTES
Assessment/Plan:      Diagnoses and all orders for this visit:    Plantar wart    Mosaic wart  -     Ambulatory Referral to Dermatology; Future      patient has moderate to severe mosaic wart on the forefeet of toes and interdigital spaces of both feet  Cantharone does not seem to be having much effect on these lesions  Recommend referral to Dermatology  Subjective:     Patient ID: Brittany Marie is a 25 y o  female  Patient was treated once with Cantharone for verruca  She has extensive mosaic verruca on both feet  She states it was painful but did not seem to have much efficacy on the warts themselves  She is wondering if there are other options  Review of Systems   Constitutional: Negative  Respiratory: Negative for shortness of breath  Cardiovascular: Negative for leg swelling  Musculoskeletal: Negative for arthralgias, gait problem and joint swelling  Skin:        Plantar warts     Neurological: Negative for numbness  Objective:     Physical Exam  Vitals reviewed  Cardiovascular:      Rate and Rhythm: Normal rate  Pulses: Normal pulses  Pulmonary:      Effort: Pulmonary effort is normal  No respiratory distress  Musculoskeletal:         General: No deformity  Skin:     Capillary Refill: Capillary refill takes less than 2 seconds  Findings: No erythema or rash  Comments: Mosaic verruca spread across multiple digits of both feet and interdigital spaces  Neurological:      Mental Status: She is alert and oriented to person, place, and time  Sensory: No sensory deficit

## 2023-01-11 ENCOUNTER — OFFICE VISIT (OUTPATIENT)
Dept: BARIATRICS | Facility: CLINIC | Age: 23
End: 2023-01-11

## 2023-01-11 VITALS
DIASTOLIC BLOOD PRESSURE: 70 MMHG | SYSTOLIC BLOOD PRESSURE: 138 MMHG | WEIGHT: 240.4 LBS | HEIGHT: 65 IN | HEART RATE: 96 BPM | BODY MASS INDEX: 40.05 KG/M2 | RESPIRATION RATE: 16 BRPM

## 2023-01-11 DIAGNOSIS — E66.01 OBESITY, CLASS III, BMI 40-49.9 (MORBID OBESITY) (HCC): Primary | ICD-10-CM

## 2023-01-11 DIAGNOSIS — R63.5 ABNORMAL WEIGHT GAIN: ICD-10-CM

## 2023-01-11 DIAGNOSIS — E05.90 SUBCLINICAL HYPERTHYROIDISM: ICD-10-CM

## 2023-01-11 DIAGNOSIS — F31.9 BIPOLAR 1 DISORDER (HCC): ICD-10-CM

## 2023-01-11 DIAGNOSIS — R79.89 ABNORMAL TSH: ICD-10-CM

## 2023-01-11 PROBLEM — E66.813 OBESITY, CLASS III, BMI 40-49.9 (MORBID OBESITY): Status: ACTIVE | Noted: 2018-08-13

## 2023-01-11 RX ORDER — CLONIDINE HYDROCHLORIDE 0.2 MG/1
TABLET ORAL
COMMUNITY
Start: 2022-11-30

## 2023-01-11 NOTE — ASSESSMENT & PLAN NOTE
- Discussed options of HealthyCORE-Intensive Lifestyle Intervention Program, Very Low Calorie Diet-VLCD, Conservative Program, Hermelindo-En-Y Gastric Bypass and Vertical Sleeve Gastrectomy and the role of weight loss medications   - Not currently interested in weight loss surgery, but would possibly consider in the future if not able to lose weight with a medical program   - Previously on metformin, but did not tolerate due to GI side effects and had difficulty taking it due to size of tablet  - Patient is interested in pursuing Conservative Program  - Initial weight loss goal of 5-10% weight loss for improved health  - Weight loss can improve patient's co-morbid conditions and/or prevent weight-related complications  - Paternal grandmother has history of cancer, she is unsure what the site was, but will try to find out  Would need to know this if considering a GLP-1 in the future  - She will check coverage of weight loss medications and update me at the next office visit  - Not a candidate for phentermine due to history of bipolar and medical marijuana  - Stop Bang 2/8  - Check CBC, CMP, A1C, fasting insulin, TSH, and lipid panel  Goals:  Do not skip meals  Food log (ie ) www myfitnesspal com,sparkpeople  com,loseit com,calorieking  com,etc  baritastic (use skinnytaste  com, dietdoctor  com or smartphone abraham [employer] for recipes)  No sugary beverages  At least 64oz of water daily  Increase physical activity by 10 minutes daily  Gradually increase physical activity to a goal of 5 days per week for 30 minutes of MODERATE intensity PLUS 2 days per week of FULL BODY resistance training (use smartphone apps [employer], Home Workout, etc )  Start food logging, weighing and measuring food  1400 calories per day  Sample menu given  Keep up the great work with water intake - at least 64 oz daily  Keep up the great work with exercise  Continue with plan to reduce alcohol intake

## 2023-01-11 NOTE — PROGRESS NOTES
Assessment/Plan:    Obesity, Class III, BMI 40-49 9 (morbid obesity) ([facility] [address] )  - Discussed options of HealthyCORE-Intensive Lifestyle Intervention Program, Very Low Calorie Diet-VLCD, Conservative Program, Hermelindo-En-Y Gastric Bypass and Vertical Sleeve Gastrectomy and the role of weight loss medications   - Not currently interested in weight loss surgery, but would possibly consider in the future if not able to lose weight with a medical program   - Previously on metformin, but did not tolerate due to GI side effects and had difficulty taking it due to size of tablet  - Patient is interested in pursuing Conservative Program  - Initial weight loss goal of 5-10% weight loss for improved health  - Weight loss can improve patient's co-morbid conditions and/or prevent weight-related complications  - Paternal grandmother has history of cancer, she is unsure what the site was, but will try to find out  Would need to know this if considering a GLP-1 in the future  - She will check coverage of weight loss medications and update me at the next office visit  - Not a candidate for phentermine due to history of bipolar and medical marijuana  - Stop Bang 2/8  - Check CBC, CMP, A1C, fasting insulin, TSH, and lipid panel  Goals:  Do not skip meals  Food log (ie ) www myfitnesspal com,sparkpeople  com,loseit com,calorieking  com,etc  baritastic (use skinnytaste  com, dietdoctor  com or smartphone abraham [employer] for recipes)  No sugary beverages  At least 64oz of water daily  Increase physical activity by 10 minutes daily  Gradually increase physical activity to a goal of 5 days per week for 30 minutes of MODERATE intensity PLUS 2 days per week of FULL BODY resistance training (use smartphone apps [employer] MED[employer], Home Workout, etc )  Start food logging, weighing and measuring food  1400 calories per day  Sample menu given  Keep up the great work with water intake - at least 64 oz daily     Keep up the great work with exercise  Continue with plan to reduce alcohol intake  Bipolar 1 disorder ([facility] [address] )  - Taking lamictal  Continue management with prescribing provider  Subclinical hyperthyroidism  - check TSH with reflex to T4  Chemo Taveras was seen today for follow-up  Diagnoses and all orders for this visit:    Obesity, Class III, BMI 40-49 9 (morbid obesity) (HCC)  -     CBC and differential; Future  -     Comprehensive metabolic panel; Future  -     HEMOGLOBIN A1C W/ EAG ESTIMATION; Future  -     Insulin, fasting; Future  -     TSH, 3rd generation with Free T4 reflex; Future  -     Lipid Panel with Direct LDL reflex; Future    Abnormal weight gain  -     CBC and differential; Future  -     Comprehensive metabolic panel; Future  -     HEMOGLOBIN A1C W/ EAG ESTIMATION; Future  -     Insulin, fasting; Future  -     TSH, 3rd generation with Free T4 reflex; Future  -     Lipid Panel with Direct LDL reflex; Future    Abnormal TSH  -     CBC and differential; Future  -     Comprehensive metabolic panel; Future  -     HEMOGLOBIN A1C W/ EAG ESTIMATION; Future  -     Insulin, fasting; Future  -     TSH, 3rd generation with Free T4 reflex; Future  -     Lipid Panel with Direct LDL reflex; Future    Bipolar 1 disorder (HCC)    Subclinical hyperthyroidism            Follow up in approximately 2 months with Non-Surgical Physician/Advanced Practitioner  Subjective:   Chief Complaint   Patient presents with   • Follow-up     MWM OD f/u; waist 43in; goal wt 180; stop bang 2-8       Patient ID: Merton Gitelman  is a 25 y o  female with excess weight/obesity here to pursue weight management  Previous notes and records have been reviewed  Past Medical History:   Diagnosis Date   • Anxiety    • Behavior problem in pediatric patient    • Bipolar disorder ([facility] [address] )    • Depression    • History of emotional problems      History reviewed  No pertinent surgical history      HPI:  Wt Readings from Last 20 Encounters:   01/11/23 109 kg (240 lb 6 4 oz)   22 93 9 kg (207 lb)   10/19/22 93 9 kg (207 lb)   22 93 9 kg (207 lb)   21 91 9 kg (202 lb 11 2 oz) (98 %, Z= 1 98)*   21 94 3 kg (208 lb) (98 %, Z= 2 06)*   21 94 3 kg (208 lb) (98 %, Z= 2 06)*   19 93 9 kg (207 lb) (98 %, Z= 2 09)*   18 84 3 kg (185 lb 12 8 oz) (97 %, Z= 1 83)*     * Growth percentiles are based on [facility] (Girls, 2-20 Years) data  Obesity/Excess Weight:  Severity: Severe  Onset:  Whole life    Modifiers: Diet and Exercise, Physician Supervised Weight Loss Program, Commercial Weight Loss Programs-ie  Weight Watchers, Seevibes, Nutrisystem, etc  and Prescription Weight Loss Medications  Contributing factors: Poor Food Choices and Mirena IUD and family history of thyroid disease  Associated symptoms: increased shortness of breath and decreased mobility     Saw MWM provider here in 2021  Started on metformin, but had GI side effects and had difficulty with medication, as the tablet was very large  Struggling with appetite and portion sizes  Currently on medical marijuana       Hydration: 64 oz water, 1-2 cups coffee with light cream, 1 cup hot tea with 1 tsp honey weekly, whole milk 8 oz daily  Alcohol: 8 drinks weekly - plans on starting to reduce this  Smoking: medical marijuana   Exercise: gym twice a week with  - cardio, endurance and strength training 50 minutes to 1 5 hours  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   Sleep: 7 hours  STOP ban/8    Highest weight: 246 lbs  Current weight: 240 4 lbs BMI 40 31  Goal weight: 180 lbs    Colonoscopy: N/A  Mammogram: N/A    The following portions of the patient's history were reviewed and updated as appropriate: allergies, current medications, past family history, past medical history, past social history, past surgical history, and problem list     Family History   Problem Relation Age of Onset   • Thyroid disease Mother    • Heart disease Mother    • Diabetes Father    • Hypertension Father    • Depression Maternal Aunt    • Breast cancer Maternal Aunt    • Diabetes Paternal Aunt    • Diabetes Paternal Uncle    • Depression Maternal Grandmother    • Lung cancer Maternal Grandmother    • Cancer Paternal Grandmother    • Diabetes Paternal Grandmother    • Diabetes Paternal Grandfather    • Stroke Neg Hx         Review of Systems   HENT: Negative for sore throat  Respiratory: Positive for cough (recently had URI, improving)  Negative for shortness of breath  Cardiovascular: Negative for chest pain and palpitations  Gastrointestinal: Negative for constipation, diarrhea, nausea and vomiting  Denies GERD   Endocrine: Negative for cold intolerance and heat intolerance  Genitourinary: Negative for dysuria  Musculoskeletal: Positive for back pain  Negative for arthralgias  Skin: Negative for rash  Neurological: Negative for headaches  Psychiatric/Behavioral: Negative for suicidal ideas (or HI)  + depression and anxiety controlled with medication       Objective:  /70   Pulse 96   Resp 16   Ht 5' 4 75" (1 645 m)   Wt 109 kg (240 lb 6 4 oz)   BMI 40 31 kg/m²     Physical Exam  Vitals and nursing note reviewed  Constitutional   General appearance: Abnormal   well developed and morbidly obese  Eyes No conjunctival injection  Ears, Nose, Mouth, and Throat Oral mucosa moist    Pulmonary   Respiratory effort: No increased work of breathing or signs of respiratory distress  Cardiovascular    Examination of extremities for edema and/or varicosities: Normal   no edema  Abdomen   Abdomen: Abnormal   The abdomen was obese      Musculoskeletal   Normal range of motion  Neurological   Gait and station: Normal    Psychiatric   Orientation to person, place and time: Normal     Affect: appropriate

## 2023-02-20 ENCOUNTER — APPOINTMENT (OUTPATIENT)
Dept: LAB | Facility: CLINIC | Age: 23
End: 2023-02-20

## 2023-02-20 DIAGNOSIS — Z11.4 SCREENING FOR HIV (HUMAN IMMUNODEFICIENCY VIRUS): ICD-10-CM

## 2023-02-20 DIAGNOSIS — R79.89 ABNORMAL TSH: ICD-10-CM

## 2023-02-20 DIAGNOSIS — E66.01 OBESITY, CLASS III, BMI 40-49.9 (MORBID OBESITY) (HCC): ICD-10-CM

## 2023-02-20 DIAGNOSIS — Z11.59 NEED FOR HEPATITIS C SCREENING TEST: ICD-10-CM

## 2023-02-20 DIAGNOSIS — R63.5 ABNORMAL WEIGHT GAIN: ICD-10-CM

## 2023-02-20 DIAGNOSIS — E05.90 SUBCLINICAL HYPERTHYROIDISM: ICD-10-CM

## 2023-02-20 LAB
BASOPHILS # BLD AUTO: 0.08 THOUSANDS/ÂΜL (ref 0–0.1)
BASOPHILS NFR BLD AUTO: 1 % (ref 0–1)
EOSINOPHIL # BLD AUTO: 0.21 THOUSAND/ÂΜL (ref 0–0.61)
EOSINOPHIL NFR BLD AUTO: 3 % (ref 0–6)
ERYTHROCYTE [DISTWIDTH] IN BLOOD BY AUTOMATED COUNT: 13.3 % (ref 11.6–15.1)
HCT VFR BLD AUTO: 42.8 % (ref 34.8–46.1)
HGB BLD-MCNC: 13.8 G/DL (ref 11.5–15.4)
IMM GRANULOCYTES # BLD AUTO: 0.02 THOUSAND/UL (ref 0–0.2)
IMM GRANULOCYTES NFR BLD AUTO: 0 % (ref 0–2)
INSULIN SERPL-ACNC: 3.5 MU/L (ref 3–25)
LYMPHOCYTES # BLD AUTO: 1.46 THOUSANDS/ÂΜL (ref 0.6–4.47)
LYMPHOCYTES NFR BLD AUTO: 23 % (ref 14–44)
MCH RBC QN AUTO: 28.9 PG (ref 26.8–34.3)
MCHC RBC AUTO-ENTMCNC: 32.2 G/DL (ref 31.4–37.4)
MCV RBC AUTO: 90 FL (ref 82–98)
MONOCYTES # BLD AUTO: 0.84 THOUSAND/ÂΜL (ref 0.17–1.22)
MONOCYTES NFR BLD AUTO: 13 % (ref 4–12)
NEUTROPHILS # BLD AUTO: 3.81 THOUSANDS/ÂΜL (ref 1.85–7.62)
NEUTS SEG NFR BLD AUTO: 60 % (ref 43–75)
NRBC BLD AUTO-RTO: 0 /100 WBCS
PLATELET # BLD AUTO: 311 THOUSANDS/UL (ref 149–390)
PMV BLD AUTO: 10.6 FL (ref 8.9–12.7)
RBC # BLD AUTO: 4.77 MILLION/UL (ref 3.81–5.12)
WBC # BLD AUTO: 6.42 THOUSAND/UL (ref 4.31–10.16)

## 2023-02-21 LAB
ALBUMIN SERPL BCP-MCNC: 3.8 G/DL (ref 3.5–5)
ALP SERPL-CCNC: 65 U/L (ref 46–116)
ALT SERPL W P-5'-P-CCNC: 41 U/L (ref 12–78)
ANION GAP SERPL CALCULATED.3IONS-SCNC: 6 MMOL/L (ref 4–13)
AST SERPL W P-5'-P-CCNC: 28 U/L (ref 5–45)
BILIRUB SERPL-MCNC: 0.31 MG/DL (ref 0.2–1)
BUN SERPL-MCNC: 15 MG/DL (ref 5–25)
C TRACH DNA SPEC QL NAA+PROBE: NEGATIVE
CALCIUM SERPL-MCNC: 9.5 MG/DL (ref 8.3–10.1)
CHLORIDE SERPL-SCNC: 103 MMOL/L (ref 96–108)
CHOLEST SERPL-MCNC: 206 MG/DL
CO2 SERPL-SCNC: 25 MMOL/L (ref 21–32)
CREAT SERPL-MCNC: 0.77 MG/DL (ref 0.6–1.3)
GFR SERPL CREATININE-BSD FRML MDRD: 110 ML/MIN/1.73SQ M
GLUCOSE P FAST SERPL-MCNC: 73 MG/DL (ref 65–99)
HCV AB S/CO SERPL IA: NON REACTIVE
HDLC SERPL-MCNC: 60 MG/DL
HIV 1+2 AB+HIV1 P24 AG SERPL QL IA: NORMAL
HIV 2 AB SERPL QL IA: NORMAL
HIV1 AB SERPL QL IA: NORMAL
HIV1 P24 AG SERPL QL IA: NORMAL
LDLC SERPL CALC-MCNC: 137 MG/DL (ref 0–100)
N GONORRHOEA DNA SPEC QL NAA+PROBE: NEGATIVE
POTASSIUM SERPL-SCNC: 4 MMOL/L (ref 3.5–5.3)
PROT SERPL-MCNC: 7.8 G/DL (ref 6.4–8.4)
SL AMB INTERPRETATION: NORMAL
SODIUM SERPL-SCNC: 134 MMOL/L (ref 135–147)
TRIGL SERPL-MCNC: 44 MG/DL
TSH SERPL DL<=0.05 MIU/L-ACNC: 0.71 UIU/ML (ref 0.45–4.5)

## 2023-02-22 LAB
EST. AVERAGE GLUCOSE BLD GHB EST-MCNC: 103 MG/DL
HBA1C MFR BLD: 5.2 %

## 2023-02-23 ENCOUNTER — TELEPHONE (OUTPATIENT)
Dept: BARIATRICS | Facility: CLINIC | Age: 23
End: 2023-02-23

## 2023-02-23 NOTE — TELEPHONE ENCOUNTER
----- Message from Reginald Mcneil 29 sent at 2/23/2023  8:19 AM EST -----  Please let the patient know I have reviewed her blood work  Total chol and LDL (bad cholesterol) were somewhat elevated and will likely improve with weight loss and cardiovascular exercise  Otherwise, her labs were within acceptable range

## 2023-03-17 ENCOUNTER — OFFICE VISIT (OUTPATIENT)
Dept: BARIATRICS | Facility: CLINIC | Age: 23
End: 2023-03-17

## 2023-03-17 VITALS
HEIGHT: 65 IN | WEIGHT: 253 LBS | RESPIRATION RATE: 16 BRPM | DIASTOLIC BLOOD PRESSURE: 70 MMHG | SYSTOLIC BLOOD PRESSURE: 122 MMHG | BODY MASS INDEX: 42.15 KG/M2 | HEART RATE: 78 BPM

## 2023-03-17 DIAGNOSIS — E05.90 SUBCLINICAL HYPERTHYROIDISM: ICD-10-CM

## 2023-03-17 DIAGNOSIS — R63.5 ABNORMAL WEIGHT GAIN: ICD-10-CM

## 2023-03-17 DIAGNOSIS — F31.9 BIPOLAR 1 DISORDER (HCC): ICD-10-CM

## 2023-03-17 DIAGNOSIS — E66.01 OBESITY, CLASS III, BMI 40-49.9 (MORBID OBESITY) (HCC): Primary | ICD-10-CM

## 2023-03-17 NOTE — PROGRESS NOTES
Assessment/Plan:     Obesity, Class III, BMI 40-49 9 (morbid obesity) ([facility] [address] )  - Patient is pursuing Conservative Program   - Not currently interested in weight loss surgery, but would possibly consider in the future if not able to lose weight with a medical program   - Has IUD  - Previously on metformin, but did not tolerate due to GI side effects and had difficulty taking it due to size of tablet  - Initial weight loss goal of 5-10% weight loss for improved health  - She clarified paternal grandmother's medical history and there is no history of cancer    - Patient denies personal history of pancreatitis  Patient also denies personal and family history of thyroid cancer and multiple endocrine neoplasia type 2 (MEN 2 tumor)  - Struggling with cravings and is interested in starting weight loss medication to help with that  - Not a candidate for phentermine due to bipolar and taking medical marijuana  - Discussed [facility] and Saxenda and she would like to go ahead and start [facility]  - Side effects of Wegovy discussed: nausea, vomiting, diarrhea, and constipation  If severe abdominal pain develops, stop Wegovy and go to the ER, as this could be pancreatitis  Monitor heart rate while on Wegovy and if resting heart rate greater than 100 beats per minute, patient will notify me  - Labs reviewed: CBC, CMP, A1c, fasting insulin, TSH, and lipid panel completed February 20, 2023  Cholesterol and LDL elevated, which will likely improve with weight loss  Otherwise, the remainder of the blood work was within acceptable range  Initial MWM restart 1/2023: 240 4 lbs BMI 40 31  Current: 253 lbs BmI 42 43  Change: +12 6 lbs  Goal: 180 lbs    Goals:   Do not skip meals  Try a protein shake or protein bar instead of skipping  Start food logging, weighing and measuring food  1400 calories per day  Increase water intake to at least 64 oz daily  Try to increase gym to 3 days per week    Get 5-10 servings of fruit and vegetables daily  Start [facility]  Bipolar 1 disorder (HCC)  - Taking lamictal and clonidine  Continue management with prescribing provider  Subclinical hyperthyroidism  TSH was normal on 2/20/2023         Kailyn Wong was seen today for follow-up  Diagnoses and all orders for this visit:    Obesity, Class III, BMI 40-49 9 (morbid obesity) ([facility] [address] )  -     Semaglutide-Weight Management (WEGOVY) 0 25 MG/0 5ML; Inject 0 5 mL (0 25 mg total) under the skin once a week    Abnormal weight gain  -     Semaglutide-Weight Management (WEGOVY) 0 25 MG/0 5ML; Inject 0 5 mL (0 25 mg total) under the skin once a week    Bipolar 1 disorder (HCC)    Subclinical hyperthyroidism          Follow up in approximately 3 months with Non-Surgical Physician/Advanced Practitioner  Subjective:   Chief Complaint   Patient presents with   • Follow-up     MWM 2mth f/u; waist 42 5in       Patient ID: Armando Dent  is a 25 y o  female with excess weight/obesity here to pursue weight management  Patient is pursuing Conservative Program    Most recent notes and records were reviewed  HPI    Wt Readings from Last 10 Encounters:   03/17/23 115 kg (253 lb)   01/11/23 109 kg (240 lb 6 4 oz)   11/09/22 93 9 kg (207 lb)   10/19/22 93 9 kg (207 lb)   06/13/22 93 9 kg (207 lb)   06/30/21 91 9 kg (202 lb 11 2 oz) (98 %, Z= 1 98)*   06/17/21 94 3 kg (208 lb) (98 %, Z= 2 06)*   06/07/21 94 3 kg (208 lb) (98 %, Z= 2 06)*   01/08/19 93 9 kg (207 lb) (98 %, Z= 2 09)*   08/13/18 84 3 kg (185 lb 12 8 oz) (97 %, Z= 1 83)*     * Growth percentiles are based on [facility] (Girls, 2-20 Years) data  Previously on metformin, but had GI side effects and had difficulty swallowing the medication, as the tablet was very large       Appetite up and down  Having cravings all the time, typically carb based  Interested in starting another weight loss medication to help with that       Currently on medical marijuana  Not food logging       B: skips or 2 eggs and 2 pieces of toast  S: none  L: leftovers - pasta and protein   S: none  D: pasta and protein  S: none     Hydration: at least 40 oz water, 1-2 cups coffee with light cream, 1 cup hot tea with 1 tsp honey weekly, whole milk 8 oz daily  Alcohol: 8 drinks weekly   Smoking: medical marijuana   Exercise: gym twice a week with  - cardio, endurance and strength training 50 minutes to 1 5 hours  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   Sleep: 7 hours       Colonoscopy: N/A  Mammogram: N/A      The following portions of the patient's history were reviewed and updated as appropriate: allergies, current medications, past family history, past medical history, past social history, past surgical history, and problem list     Family History   Problem Relation Age of Onset   • Thyroid disease Mother    • Heart disease Mother    • Diabetes Father    • Hypertension Father    • Depression Maternal Aunt    • Breast cancer Maternal Aunt    • Diabetes Paternal Aunt    • Diabetes Paternal Uncle    • Depression Maternal Grandmother    • Lung cancer Maternal Grandmother    • Diabetes Paternal Grandmother         prediabetes   • Diabetes Paternal Grandfather    • Stroke Neg Hx         Review of Systems   HENT: Negative for sore throat  Respiratory: Negative for cough and shortness of breath  Cardiovascular: Negative for chest pain and palpitations  Gastrointestinal: Negative for abdominal pain, constipation, diarrhea, nausea and vomiting  Denies GERD   Musculoskeletal: Negative for arthralgias and back pain  Skin: Negative for rash  Psychiatric/Behavioral: Negative for suicidal ideas (or HI)  + depression and anxiety controlled with medications       Objective:  /70   Pulse 78   Resp 16   Ht 5' 4 75" (1 645 m)   Wt 115 kg (253 lb)   BMI 42 43 kg/m²     Physical Exam  Vitals and nursing note reviewed          Constitutional   General appearance: Abnormal   well developed and morbidly obese  Eyes No conjunctival injection  Ears, Nose, Mouth, and Throat Oral mucosa moist    Pulmonary   Respiratory effort: No increased work of breathing or signs of respiratory distress  Cardiovascular    Examination of extremities for edema and/or varicosities: Normal   no edema  Abdomen   Abdomen: Abnormal   The abdomen was obese      Musculoskeletal   Normal range of motion  Neurological   Gait and station: Normal    Psychiatric   Orientation to person, place and time: Normal     Affect: appropriate

## 2023-03-17 NOTE — PATIENT INSTRUCTIONS
I am sending the [facility] to your pharmacy  This will start the preauthorization process  My office takes care of that  It can take up to 3 weeks to process  Start Wegovy 0 25 mg subcutaneously once a week for 4 weeks, then increase to 0 5 mg subcutaneously each week  After you take the second pen of the starting dose of 0 25 mg, please message me to submit the next dose of 0 5 mg, as we will also likely need to do another preauth for that one      - Side effects of Wegovy discussed: nausea, vomiting, diarrhea, and constipation  If severe abdominal pain develops, stop Wegovy and go to the ER, as this could be pancreatitis  Monitor heart rate while on Wegovy and if resting heart rate greater than 100 beats per minute, please notify me

## 2023-03-17 NOTE — ASSESSMENT & PLAN NOTE
- Patient is pursuing Conservative Program   - Not currently interested in weight loss surgery, but would possibly consider in the future if not able to lose weight with a medical program   - Has IUD  - Previously on metformin, but did not tolerate due to GI side effects and had difficulty taking it due to size of tablet  - Initial weight loss goal of 5-10% weight loss for improved health  - She clarified paternal grandmother's medical history and there is no history of cancer    - Patient denies personal history of pancreatitis  Patient also denies personal and family history of thyroid cancer and multiple endocrine neoplasia type 2 (MEN 2 tumor)  - Struggling with cravings and is interested in starting weight loss medication to help with that  - Not a candidate for phentermine due to bipolar and taking medical marijuana  - Discussed [facility] and Saxenda and she would like to go ahead and start [facility]  - Side effects of Wegovy discussed: nausea, vomiting, diarrhea, and constipation  If severe abdominal pain develops, stop Wegovy and go to the ER, as this could be pancreatitis  Monitor heart rate while on Wegovy and if resting heart rate greater than 100 beats per minute, patient will notify me  - Labs reviewed: CBC, CMP, A1c, fasting insulin, TSH, and lipid panel completed February 20, 2023  Cholesterol and LDL elevated, which will likely improve with weight loss  Otherwise, the remainder of the blood work was within acceptable range  Initial MWM restart 1/2023: 240 4 lbs BMI 40 31  Current: 253 lbs BmI 42 43  Change: +12 6 lbs  Goal: 180 lbs    Goals:   Do not skip meals  Try a protein shake or protein bar instead of skipping  Start food logging, weighing and measuring food  1400 calories per day  Increase water intake to at least 64 oz daily  Try to increase gym to 3 days per week  Get 5-10 servings of fruit and vegetables daily  Start [facility]

## 2023-03-24 ENCOUNTER — TELEPHONE (OUTPATIENT)
Dept: BARIATRICS | Facility: CLINIC | Age: 23
End: 2023-03-24

## 2023-03-28 ENCOUNTER — OFFICE VISIT (OUTPATIENT)
Dept: BARIATRICS | Facility: CLINIC | Age: 23
End: 2023-03-28

## 2023-03-28 VITALS
WEIGHT: 251.6 LBS | RESPIRATION RATE: 16 BRPM | SYSTOLIC BLOOD PRESSURE: 128 MMHG | HEIGHT: 65 IN | BODY MASS INDEX: 41.92 KG/M2 | DIASTOLIC BLOOD PRESSURE: 70 MMHG | HEART RATE: 76 BPM

## 2023-03-28 DIAGNOSIS — E05.90 SUBCLINICAL HYPERTHYROIDISM: ICD-10-CM

## 2023-03-28 DIAGNOSIS — R79.89 LOW VITAMIN D LEVEL: ICD-10-CM

## 2023-03-28 DIAGNOSIS — E66.01 OBESITY, CLASS III, BMI 40-49.9 (MORBID OBESITY) (HCC): Primary | ICD-10-CM

## 2023-03-28 RX ORDER — TOPIRAMATE 50 MG/1
50 TABLET, FILM COATED ORAL
Qty: 90 TABLET | Refills: 0 | Status: SHIPPED | OUTPATIENT
Start: 2023-03-28

## 2023-03-28 NOTE — PATIENT INSTRUCTIONS
Take topiramate 25mg (1/2 tablet) nightly for 2 weeks, then increase to 50mg (1 tablet) nightly thereafter  Continue with the dietary changes and increased activity  Practice mindful eating  Be sure to set aside time to eat, eat slowly, and savor your food  Exercise:  Studies have shown that the ideal exercise goal is somewhere between 150 to 300 minutes of moderate intensity exercise a week  Start with exercising 10 minutes every other day and gradually increase physical activity with a goal of at least 150 minutes of moderate intensity exercise a week, divided over at least 3 days a week  An example of this would be exercising 30 minutes a day, 5 days a week  Resistance training can increase muscle mass and increase our resting metabolic rate  FULL BODY resistance training is recommended 2-3 times a week  Do not do this on consecutive days to allow for muscle recovery  Aim for a bare minimum 5000 steps, even on days you do not exercise

## 2023-03-28 NOTE — PROGRESS NOTES
Assessment/Plan:  Seth Villarreal was seen today for follow-up  Diagnoses and all orders for this visit:    Obesity, Class III, BMI 40-49 9 (morbid obesity) (HCC)  -     topiramate (Topamax) 50 MG tablet; Take 1 tablet (50 mg total) by mouth at bedtime    Take topiramate 25mg (1/2 tablet) nightly for 2 weeks, then increase to 50mg (1 tablet) nightly thereafter  Continue with the dietary changes and increased activity  Practice mindful eating  Be sure to set aside time to eat, eat slowly, and savor your food  Exercise:  Studies have shown that the ideal exercise goal is somewhere between 150 to 300 minutes of moderate intensity exercise a week  Start with exercising 10 minutes every other day and gradually increase physical activity with a goal of at least 150 minutes of moderate intensity exercise a week, divided over at least 3 days a week  An example of this would be exercising 30 minutes a day, 5 days a week  Resistance training can increase muscle mass and increase our resting metabolic rate  FULL BODY resistance training is recommended 2-3 times a week  Do not do this on consecutive days to allow for muscle recovery  Aim for a bare minimum 5000 steps, even on days you do not exercise  Subclinical hyperthyroidism  Asymptomatic  Most recent TSH within normal limits  Low vitamin D level  Compliant with supplementation  ______________________________________________________________        Subjective:   Chief Complaint   Patient presents with   • Follow-up     Discuss medication     Patient here to discuss weight associated problems and nutrition goals  HPI: Yusra Castro  is a 25 y o  female with history of bipolar disorder, and excess weight  Weight loss plan:  Conservative Program    Most recent notes and records were reviewed  She reports that her insurance does not cover any weight loss medications therefore Wegovy coverage was denied    She reports that her main issue is "late-night cravings which she attributes to medical marijuana use  Use of medical marijuana has helped her decrease the mood stabilizing agents that she takes for bipolar disorder and she does not wish to discontinue it for this reason  Denies history of kidney stones or glaucoma  Wt Readings from Last 10 Encounters:   03/28/23 114 kg (251 lb 9 6 oz)   03/17/23 115 kg (253 lb)   01/11/23 109 kg (240 lb 6 4 oz)   11/09/22 93 9 kg (207 lb)   10/19/22 93 9 kg (207 lb)   06/13/22 93 9 kg (207 lb)   06/30/21 91 9 kg (202 lb 11 2 oz) (98 %, Z= 1 98)*   06/17/21 94 3 kg (208 lb) (98 %, Z= 2 06)*   06/07/21 94 3 kg (208 lb) (98 %, Z= 2 06)*   01/08/19 93 9 kg (207 lb) (98 %, Z= 2 09)*     * Growth percentiles are based on CDC (Girls, 2-20 Years) data  Review Of Systems:  Review of Systems   Constitutional: Negative for activity change, appetite change, fatigue and fever  Respiratory: Negative for cough and shortness of breath  Cardiovascular: Negative for chest pain, palpitations and leg swelling  Gastrointestinal: Negative for abdominal pain, constipation, diarrhea, nausea and vomiting  Endocrine: Negative for cold intolerance and heat intolerance  Genitourinary: Negative for difficulty urinating and dysuria  Musculoskeletal: Negative for arthralgias, back pain and gait problem  Skin: Negative for pallor and rash  Neurological: Negative for headaches  Psychiatric/Behavioral: Negative for dysphoric mood, sleep disturbance and suicidal ideas (or HI)  The patient is not nervous/anxious  Objective:  /70   Pulse 76   Resp 16   Ht 5' 4 75\" (1 645 m)   Wt 114 kg (251 lb 9 6 oz)   BMI 42 19 kg/m²   Physical Exam  Vitals and nursing note reviewed  Constitutional:       General: She is not in acute distress  Appearance: Normal appearance  She is not ill-appearing or diaphoretic  Eyes:      General: No scleral icterus    Cardiovascular:      Rate and Rhythm: Normal rate and regular " rhythm  Pulses: Normal pulses  Heart sounds: No murmur heard  Pulmonary:      Effort: Pulmonary effort is normal  No respiratory distress  Breath sounds: Normal breath sounds  No wheezing or rhonchi  Abdominal:      General: Bowel sounds are normal  There is no distension  Palpations: Abdomen is soft  There is no mass  Tenderness: There is no abdominal tenderness  Musculoskeletal:      Cervical back: Neck supple  Right lower leg: No edema  Left lower leg: No edema  Lymphadenopathy:      Cervical: No cervical adenopathy  Skin:     Capillary Refill: Capillary refill takes less than 2 seconds  Findings: No lesion or rash  Neurological:      Mental Status: She is alert and oriented to person, place, and time  Gait: Gait normal    Psychiatric:         Mood and Affect: Mood normal          Behavior: Behavior normal        Labs and Imaging  Recent labs and imaging have been personally reviewed    Lab Results   Component Value Date    WBC 6 42 02/20/2023    HGB 13 8 02/20/2023    HCT 42 8 02/20/2023    MCV 90 02/20/2023     02/20/2023     Lab Results   Component Value Date    SODIUM 134 (L) 02/20/2023    K 4 0 02/20/2023     02/20/2023    CO2 25 02/20/2023    AGAP 6 02/20/2023    BUN 15 02/20/2023    CREATININE 0 77 02/20/2023    GLUF 73 02/20/2023    CALCIUM 9 5 02/20/2023    AST 28 02/20/2023    ALT 41 02/20/2023    ALKPHOS 65 02/20/2023    TP 7 8 02/20/2023    TBILI 0 31 02/20/2023    EGFR 110 02/20/2023     Lab Results   Component Value Date    HGBA1C 5 2 02/20/2023     Lab Results   Component Value Date    ABB5PSRQKHRN 0 711 02/20/2023     Lab Results   Component Value Date    CHOLESTEROL 206 (H) 02/20/2023     Lab Results   Component Value Date    HDL 60 02/20/2023     Lab Results   Component Value Date    TRIG 44 02/20/2023     Lab Results   Component Value Date    LDLCALC 137 (H) 02/20/2023

## 2023-04-23 ENCOUNTER — TELEPHONE (OUTPATIENT)
Dept: DERMATOLOGY | Facility: CLINIC | Age: 23
End: 2023-04-23

## 2023-04-23 NOTE — TELEPHONE ENCOUNTER
Patient called to reschedule the appointment she could not make on 04/12/2023 8:30am Darius Gardiner MD  Please give her a call back

## 2023-07-14 ENCOUNTER — OFFICE VISIT (OUTPATIENT)
Dept: FAMILY MEDICINE CLINIC | Facility: CLINIC | Age: 23
End: 2023-07-14
Payer: COMMERCIAL

## 2023-07-14 VITALS
HEIGHT: 64 IN | SYSTOLIC BLOOD PRESSURE: 126 MMHG | HEART RATE: 94 BPM | BODY MASS INDEX: 41.23 KG/M2 | OXYGEN SATURATION: 99 % | DIASTOLIC BLOOD PRESSURE: 86 MMHG | WEIGHT: 241.5 LBS | TEMPERATURE: 98 F

## 2023-07-14 DIAGNOSIS — E66.01 OBESITY, CLASS III, BMI 40-49.9 (MORBID OBESITY) (HCC): ICD-10-CM

## 2023-07-14 DIAGNOSIS — E05.90 SUBCLINICAL HYPERTHYROIDISM: ICD-10-CM

## 2023-07-14 DIAGNOSIS — F31.9 BIPOLAR 1 DISORDER (HCC): ICD-10-CM

## 2023-07-14 DIAGNOSIS — Z00.00 ANNUAL PHYSICAL EXAM: Primary | ICD-10-CM

## 2023-07-14 DIAGNOSIS — B07.0 PLANTAR WARTS: ICD-10-CM

## 2023-07-14 PROCEDURE — 99395 PREV VISIT EST AGE 18-39: CPT | Performed by: FAMILY MEDICINE

## 2023-07-14 NOTE — PROGRESS NOTES
[address]    NAME: Valentin Caballero  AGE: 25 y.o. SEX: female  : 2000     DATE: 2023     Assessment and Plan:     Problem List Items Addressed This Visit        Endocrine    Subclinical hyperthyroidism       Other    Obesity, Class III, BMI 40-49.9 (morbid obesity) ([address] [facility])    Bipolar 1 disorder ([address] [facility])   Other Visit Diagnoses     Annual physical exam    -  Primary    Relevant Orders    Ambulatory referral to Obstetrics / Gynecology    Plantar warts            Follows with psychiatry every 3 months   Referral for obgyn   Does not have a therapist due to financial restraints   Reschedule appointment with derm for plantar warts       Immunizations and preventive care screenings were discussed with patient today. Appropriate education was printed on patient's after visit summary. Counseling:  Alcohol/drug use: discussed moderation in alcohol intake, the recommendations for healthy alcohol use, and avoidance of illicit drug use. Dental Health: discussed importance of regular tooth brushing, flossing, and dental visits. Injury prevention: discussed safety/seat belts, safety helmets, smoke detectors, carbon dioxide detectors, and smoking near bedding or upholstery. Sexual health: discussed sexually transmitted diseases, partner selection, use of condoms, avoidance of unintended pregnancy, and contraceptive alternatives. Exercise: the importance of regular exercise/physical activity was discussed. Recommend exercise 3-5 times per week for at least 30 minutes. No follow-ups on file. Chief Complaint:     Chief Complaint   Patient presents with   • Physical Exam      History of Present Illness:     Adult Annual Physical   Patient here for a comprehensive physical exam. The patient reports problems - Plantar warts, bipolar disorder, weight gain.     Diet and Physical Activity  Diet/Nutrition: well balanced diet and consuming 3-5 servings of fruits/vegetables daily. Follows with weight management. Trialed on metformin and Topamax. 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 Depression Screening  PHQ-2/9 Depression Screening         General Health  Sleep: sleeps well, gets 7-8 hours of sleep on average and Currently on Lamictal.   Hearing: normal - bilateral.  Vision: goes for regular eye exams. Dental: regular dental visits. /GYN Health  Establish with obgyn   Due for cervical cancer screening      Review of Systems:     Review of Systems   Constitutional: Positive for unexpected weight change. Negative for fatigue and fever. HENT: Negative for sore throat. Eyes: Negative for visual disturbance. Respiratory: Negative for cough, chest tightness and shortness of breath. Cardiovascular: Negative for chest pain, palpitations and leg swelling. Gastrointestinal: Negative for abdominal pain, constipation, diarrhea and nausea. Endocrine: Negative for cold intolerance and heat intolerance. Genitourinary: Negative for flank pain. Musculoskeletal: Negative for back pain and neck pain. Skin: Negative for rash. Neurological: Negative for headaches. Psychiatric/Behavioral:        Manic episode 2 months ago. Patient states she had to have pets. She bought multiple pets. Upcoming appointment with psychiatry      Past Medical History:     Past Medical History:   Diagnosis Date   • Anxiety    • Behavior problem in pediatric patient    • Bipolar disorder ([address])    • Depression    • History of emotional problems       Past Surgical History:     History reviewed. No pertinent surgical history.    Social History:     Social History     Socioeconomic History   • Marital status: Single     Spouse name: None   • Number of children: None   • Years of education: None   • Highest education level: None   Occupational History   • None   Tobacco Use   • Smoking status: Every Day   • Smokeless tobacco: Never   Vaping Use • Vaping Use: Former   • Substances: THC   Substance and Sexual Activity   • Alcohol use: No   • Drug use: Yes     Types: Marijuana     Comment: Medical marijuana    • Sexual activity: Not Currently     Partners: Male     Birth control/protection: I.U.D. Other Topics Concern   • None   Social History Narrative    Most recent tobacco use screenin2017    Sexual orientation: Homosexual    Sexually active: No     Social Determinants of Health     Financial Resource Strain: Not on file   Food Insecurity: Not on file   Transportation Needs: Not on file   Physical Activity: Not on file   Stress: Not on file   Social Connections: Not on file   Intimate Partner Violence: Not on file   Housing Stability: Not on file      Family History:     Family History   Problem Relation Age of Onset   • Thyroid disease Mother    • Heart disease Mother    • Diabetes Father    • Hypertension Father    • Depression Maternal Aunt    • Breast cancer Maternal Aunt    • Diabetes Paternal Aunt    • Diabetes Paternal Uncle    • Depression Maternal Grandmother    • Lung cancer Maternal Grandmother    • Diabetes Paternal Grandmother         prediabetes   • Diabetes Paternal Grandfather    • Stroke Neg Hx       Current Medications:     Current Outpatient Medications   Medication Sig Dispense Refill   • cloNIDine (CATAPRES) 0.2 mg tablet TAKE 1 TABLET BY MOUTH ONCE A DAY BETWEEN MIDNIGHT AND 3 IN THE MORNING FOR INSOMNIA AS NEEDED     • hydrOXYzine pamoate (VISTARIL) 25 mg capsule Take 25 mg by mouth daily     • lamoTRIgine  MG TB24 Take 1 tablet by mouth every morning     • Multiple Vitamins-Minerals (ONE-A-DAY WOMENS PO) Take by mouth (Patient not taking: Reported on 2023)     • VITAMIN C, CALCIUM ASCORBATE, PO Take by mouth (Patient not taking: Reported on 2023)       No current facility-administered medications for this visit.       Allergies:     No Known Allergies   Physical Exam:     /86 (BP Location: Left arm, Patient Position: Sitting, Cuff Size: Large)   Pulse 94   Temp 98 °F (36.7 °C)   Ht 5' 4" (1.626 m)   Wt 110 kg (241 lb 8 oz)   SpO2 99%   BMI 41.45 kg/m²     Physical Exam  Vitals and nursing note reviewed. Constitutional:       Appearance: Normal appearance. She is well-developed. She is obese. HENT:      Head: Normocephalic and atraumatic. Eyes:      Extraocular Movements: Extraocular movements intact. Conjunctiva/sclera: Conjunctivae normal.      Pupils: Pupils are equal, round, and reactive to light. Cardiovascular:      Rate and Rhythm: Normal rate and regular rhythm. Pulmonary:      Effort: Pulmonary effort is normal.      Breath sounds: Normal breath sounds. Abdominal:      General: Bowel sounds are normal.      Palpations: Abdomen is soft. Musculoskeletal:         General: Normal range of motion. Cervical back: Normal range of motion. Skin:     General: Skin is warm and dry. Comments: Plantar warts      Neurological:      General: No focal deficit present. Mental Status: She is alert and oriented to person, place, and time.    Psychiatric:         Mood and Affect: Mood normal.         Speech: Speech normal.         Behavior: Behavior normal.          Jamar Keller MD   [address]

## 2023-09-12 ENCOUNTER — OFFICE VISIT (OUTPATIENT)
Dept: BARIATRICS | Facility: CLINIC | Age: 23
End: 2023-09-12
Payer: COMMERCIAL

## 2023-09-12 VITALS
BODY MASS INDEX: 40.53 KG/M2 | RESPIRATION RATE: 16 BRPM | WEIGHT: 237.4 LBS | HEIGHT: 64 IN | SYSTOLIC BLOOD PRESSURE: 126 MMHG | HEART RATE: 70 BPM | DIASTOLIC BLOOD PRESSURE: 80 MMHG

## 2023-09-12 DIAGNOSIS — E66.01 OBESITY, CLASS III, BMI 40-49.9 (MORBID OBESITY) (HCC): Primary | ICD-10-CM

## 2023-09-12 DIAGNOSIS — F31.9 BIPOLAR 1 DISORDER (HCC): ICD-10-CM

## 2023-09-12 PROCEDURE — 99213 OFFICE O/P EST LOW 20 MIN: CPT | Performed by: PHYSICIAN ASSISTANT

## 2023-09-12 NOTE — PROGRESS NOTES
Assessment/Plan:    Obesity, Class III, BMI 40-49.9 (morbid obesity) ([address])  - Patient is pursuing Conservative Program   - Now interested in surgery as she has tried medication options. Had previously been on metformin (GI side effects), topamax(depression) and insurance does not cover AOM. Unable to take contrave or phentermine due to mood d/o   - discussed surgical process. She denies any recently hospitalizations for depression. She does use medical marijuana but does not use tobacco and drinks alcohol rarely  -to schedule surgical consult. Hand out given today      - Labs reviewed: CBC, CMP, A1c, fasting insulin, TSH, and lipid panel completed February 20, 2023. Cholesterol and LDL elevated, which will likely improve with weight loss. Otherwise, the remainder of the blood work was within acceptable range. Initial MWM restart 1/2023: 240.4 lbs BMI 40.31  Last Visit: 251.6  Current: 237.4 lbs  Change: -3lb (-14.2lbs from last visit)  Goal: 180 lbs    Bipolar 1 disorder ([address])  Following with psych. Mood now stable after stopping topamax        Follow up in approximately 2 weeks with Surgical Dietician, Surgical  and Surgical . Diagnoses and all orders for this visit:    Obesity, Class III, BMI 40-49.9 (morbid obesity) ([address])    Bipolar 1 disorder ([facility]CC)          Subjective:   Chief Complaint   Patient presents with   • Follow-up     MWM 3mth f/u; waist 40in        Patient ID: Alla Villagomez  is a 25 y.o. female with excess weight/obesity here to pursue weight managment. Patient is pursuing Conservative Program.     HPI  Here for MWM followup. She was on topamax and was having mood changes and felt suicidal.  She was warned of this and stopped the medication. She stopped it about 1.5 month. She has tried to increase protein based meals.   She does feel hunger is still a concern and she is interested in surgery  Wt Readings from Last 10 Encounters:   09/12/23 108 kg (237 lb 6.4 oz)   07/14/23 110 kg (241 lb 8 oz)   03/28/23 114 kg (251 lb 9.6 oz)   03/17/23 115 kg (253 lb)   01/11/23 109 kg (240 lb 6.4 oz)   11/09/22 93.9 kg (207 lb)   10/19/22 93.9 kg (207 lb)   06/13/22 93.9 kg (207 lb)   06/30/21 91.9 kg (202 lb 11.2 oz) (98 %, Z= 1.98)*   06/17/21 94.3 kg (208 lb) (98 %, Z= 2.06)*     * Growth percentiles are based on [facility] (Girls, 2-20 Years) data. Food logging:  Increased appetite/cravings:  Fruit/Vegetable servings:  Exercise:  Hydration:getting at least 48 oz water, monster 2-3 a day SF    Colonoscopy-Not applicable    The following portions of the patient's history were reviewed and updated as appropriate:   She  has a past medical history of Anxiety, Behavior problem in pediatric patient, Bipolar disorder ([address] [facility]), Depression, and History of emotional problems. She   Patient Active Problem List    Diagnosis Date Noted   • Medical marijuana use 06/13/2022   • Subclinical hyperthyroidism 06/12/2022   • Bipolar 1 disorder ([address]) 06/07/2021   • Low vitamin D level 08/13/2018   • Obesity, Class III, BMI 40-49.9 (morbid obesity) ([address]) 08/13/2018   • MDD (major depressive disorder), recurrent episode, severe ([address] [facility]) 01/10/2018   • Panic disorder 01/10/2018     She  has no past surgical history on file. Her family history includes Breast cancer in her maternal aunt; Depression in her maternal aunt and maternal grandmother; Diabetes in her father, paternal aunt, paternal grandfather, paternal grandmother, and paternal uncle; Heart disease in her mother; Hypertension in her father; Lung cancer in her maternal grandmother; Thyroid disease in her mother. She  reports that she has been smoking. She has never used smokeless tobacco. She reports current drug use. Drug: Marijuana. She reports that she does not drink alcohol.   Current Outpatient Medications   Medication Sig Dispense Refill   • cloNIDine (CATAPRES) 0.2 mg tablet TAKE 1 TABLET BY MOUTH ONCE A DAY BETWEEN MIDNIGHT AND 3 IN THE MORNING FOR INSOMNIA AS NEEDED     • hydrOXYzine pamoate (VISTARIL) 25 mg capsule Take 25 mg by mouth daily     • Multiple Vitamins-Minerals (ONE-A-DAY WOMENS PO) Take by mouth     • lamoTRIgine  MG TB24 Take 1 tablet by mouth every morning     • VITAMIN C, CALCIUM ASCORBATE, PO Take by mouth       No current facility-administered medications for this visit. Current Outpatient Medications on File Prior to Visit   Medication Sig   • cloNIDine (CATAPRES) 0.2 mg tablet TAKE 1 TABLET BY MOUTH ONCE A DAY BETWEEN MIDNIGHT AND 3 IN THE MORNING FOR INSOMNIA AS NEEDED   • hydrOXYzine pamoate (VISTARIL) 25 mg capsule Take 25 mg by mouth daily   • Multiple Vitamins-Minerals (ONE-A-DAY WOMENS PO) Take by mouth   • lamoTRIgine  MG TB24 Take 1 tablet by mouth every morning   • VITAMIN C, CALCIUM ASCORBATE, PO Take by mouth     No current facility-administered medications on file prior to visit. She has No Known Allergies. .    Review of Systems   Constitutional: Negative for chills and fever. Respiratory: Negative for shortness of breath. Cardiovascular: Negative for chest pain. Genitourinary: Negative for difficulty urinating. Psychiatric/Behavioral: Negative for dysphoric mood (now improved). Objective:    /80   Pulse 70   Resp 16   Ht 5' 4" (1.626 m)   Wt 108 kg (237 lb 6.4 oz)   BMI 40.75 kg/m²      Physical Exam  Vitals and nursing note reviewed. Constitutional:       General: She is not in acute distress. Appearance: She is well-developed. She is obese. HENT:      Head: Normocephalic and atraumatic. Eyes:      Conjunctiva/sclera: Conjunctivae normal.   Neck:      Thyroid: No thyromegaly. Pulmonary:      Effort: Pulmonary effort is normal. No respiratory distress. Skin:     Findings: No rash (visible). Neurological:      Mental Status: She is alert and oriented to person, place, and time.    Psychiatric:         Mood and Affect: Mood normal.         Behavior: Behavior normal.

## 2023-09-13 NOTE — ASSESSMENT & PLAN NOTE
- Patient is pursuing Conservative Program   - Now interested in surgery as she has tried medication options. Had previously been on metformin (GI side effects), topamax(depression) and insurance does not cover AOM. Unable to take contrave or phentermine due to mood d/o   - discussed surgical process. She denies any recently hospitalizations for depression. She does use medical marijuana but does not use tobacco and drinks alcohol rarely  -to schedule surgical consult. Hand out given today      - Labs reviewed: CBC, CMP, A1c, fasting insulin, TSH, and lipid panel completed February 20, 2023. Cholesterol and LDL elevated, which will likely improve with weight loss. Otherwise, the remainder of the blood work was within acceptable range.     Initial MWM restart 1/2023: 240.4 lbs BMI 40.31  Last Visit: 251.6  Current: 237.4 lbs  Change: -3lb (-14.2lbs from last visit)  Goal: 180 lbs

## 2023-10-03 ENCOUNTER — OFFICE VISIT (OUTPATIENT)
Dept: BARIATRICS | Facility: CLINIC | Age: 23
End: 2023-10-03

## 2023-10-03 VITALS
BODY MASS INDEX: 38.82 KG/M2 | SYSTOLIC BLOOD PRESSURE: 116 MMHG | DIASTOLIC BLOOD PRESSURE: 76 MMHG | TEMPERATURE: 98.9 F | HEART RATE: 81 BPM | HEIGHT: 65 IN | WEIGHT: 233 LBS

## 2023-10-03 DIAGNOSIS — R63.5 ABNORMAL WEIGHT GAIN: Primary | ICD-10-CM

## 2023-10-03 PROCEDURE — RECHECK

## 2023-10-03 NOTE — PROGRESS NOTES
Bariatric Nutrition Assessment Note    Type of surgery    Preop- 4 months required  Surgery Date: ***  *** {days/wks/mos/yrs:11627}  post-op  Surgeon: {Bariatric Surgeons:48519}    Nutrition Assessment   Mary Aponte  25 y.o.  female     Wt with BMI of 25: ***  Pre-Op Excess Wt: ***  Ht 5' 4.7" (1.643 m)   BMI 39.87 kg/m²     {energy needs:64175}  Estimated calories for weight loss *** ( 1-2# per wk wt loss - sedentary )  Estimated protein needs ***(1.0-1.5 gms/kg IBW )   Estimated fluid needs ***(30-35 ml/kg IBW )      Weight History   Onset of Obesity: {NUT Child or Adult:89631}  Family history of obesity: {YES/NO:53820}  Wt Loss Attempts: {NUT Weight Loss Methods:15439}  Patient has tried the above for 6 months or more with insufficient weight loss or weight regain, which is why patient has requested to be evaluated for weight loss surgery today  Maximum Wt Lost: ***      Review of History and Medications   Past Medical History:   Diagnosis Date   • Anxiety    • Behavior problem in pediatric patient    • Bipolar disorder ([address])    • Depression    • History of emotional problems      No past surgical history on file. Social History     Socioeconomic History   • Marital status: Single     Spouse name: Not on file   • Number of children: Not on file   • Years of education: Not on file   • Highest education level: Not on file   Occupational History   • Not on file   Tobacco Use   • Smoking status: Every Day   • Smokeless tobacco: Never   Vaping Use   • Vaping Use: Former   • Substances: THC   Substance and Sexual Activity   • Alcohol use: No   • Drug use: Yes     Types: Marijuana     Comment: Medical marijuana    • Sexual activity: Not Currently     Partners: Male     Birth control/protection: I.U.D.    Other Topics Concern   • Not on file   Social History Narrative    Most recent tobacco use screenin2017    Sexual orientation: Homosexual    Sexually active: No     Social Determinants of Health Financial Resource Strain: Not on file   Food Insecurity: Not on file   Transportation Needs: Not on file   Physical Activity: Not on file   Stress: Not on file   Social Connections: Not on file   Intimate Partner Violence: Not on file   Housing Stability: Not on file       Current Outpatient Medications:   •  cloNIDine (CATAPRES) 0.2 mg tablet, TAKE 1 TABLET BY MOUTH ONCE A DAY BETWEEN MIDNIGHT AND 3 IN THE MORNING FOR INSOMNIA AS NEEDED, Disp: , Rfl:   •  hydrOXYzine pamoate (VISTARIL) 25 mg capsule, Take 25 mg by mouth daily, Disp: , Rfl:   •  lamoTRIgine  MG TB24, Take 1 tablet by mouth every morning, Disp: , Rfl:   •  Multiple Vitamins-Minerals (ONE-A-DAY WOMENS PO), Take by mouth, Disp: , Rfl:   •  VITAMIN C, CALCIUM ASCORBATE, PO, Take by mouth, Disp: , Rfl:   Food Intake and Lifestyle Assessment   Food Intake Assessment completed via {oral intake:60799}  Breakfast: ***  Snack: ***   Lunch: ***  Snack: ***  Dinner: ***  Snack: ***  Beverage intake: {beverage intake:65645}  Protein supplement: ***  Estimated protein intake per day: ***  Estimated fluid intake per day: ***  Meals eaten away from home: ***  Typical meal pattern: *** meals per day and *** snacks per day  Eating Behaviors: {NUT Eating Behaviors Initial:59886}  Food allergies or intolerances: No Known Allergies  Cultural or Voodoo considerations: ***    Physical Assessment  Physical Activity  Types of exercise: {NUT EXERCISE FGWC:14932}  Current physical limitations: ***    Psychosocial Assessment   Support systems: {nutrition support systems:74217}  Socioeconomic factors: ***    Nutrition Diagnosis  Diagnosis: {LV LM WT LOSS NUTRITIONAL DX:20395}  Related to: {LV LM WT LOSS RELATED TO:71391}  As Evidenced by: {LV LM WT LOSS AS EVIDENCED LW:58547}     Nutrition Prescription: Recommend the following diet  {nutrition prescription:74821}    Interventions and Teaching   Discussed pre-op and post-op nutrition guidelines.        Patient educated and handouts provided. {bariatric patient education:39159}    Patient is not currently pregnant and doesn't desire to become pregnant a minimum of one year post-op    Education provided to: {Patient/Family/Caregiver:80580}    Barriers to learning: {NUT Barriers to Tx:33906}    Readiness to change: {dx readiness to change:97243}    Prior research on procedure: {prior research:98987}    Comprehension: {RESPONSES; PT VUP:02404}     Expected Compliance: {GOOD:22647}  Recommendations  Pt is an appropriate candidate for surgery.  {YES NO NA ORDERS [facility]:32503}  Pt should make the following changes and then be reassessed for weight loss surgery:   ***  Evaluation / Monitoring  Dietitian to Monitor: {dietitian to monitor:69327}  ***  Goals  {GOALS:16578}    Time Spent:   {Time; intervals:37273} ***

## 2024-04-21 ENCOUNTER — TELEPHONE (OUTPATIENT)
Dept: OTHER | Facility: OTHER | Age: 24
End: 2024-04-21

## 2024-04-21 NOTE — TELEPHONE ENCOUNTER
"Pt stated, I would like to schedule an appointment for an autism assessment.\"    Please follow up when the office reopen, thank you.  "

## 2024-04-22 ENCOUNTER — TELEPHONE (OUTPATIENT)
Dept: PSYCHIATRY | Facility: CLINIC | Age: 24
End: 2024-04-22

## 2024-04-22 NOTE — TELEPHONE ENCOUNTER
IC called pt after receiving [employer]. Pt looking for autism assessment. IC provided pt information on 4 local psychology groups that would be able to do this.

## 2024-07-16 ENCOUNTER — OFFICE VISIT (OUTPATIENT)
Dept: FAMILY MEDICINE CLINIC | Facility: CLINIC | Age: 24
End: 2024-07-16
Payer: COMMERCIAL

## 2024-07-16 VITALS
TEMPERATURE: 98.7 F | RESPIRATION RATE: 16 BRPM | BODY MASS INDEX: 39.61 KG/M2 | HEIGHT: 64 IN | WEIGHT: 232 LBS | SYSTOLIC BLOOD PRESSURE: 124 MMHG | OXYGEN SATURATION: 98 % | HEART RATE: 89 BPM | DIASTOLIC BLOOD PRESSURE: 82 MMHG

## 2024-07-16 DIAGNOSIS — M22.2X2 PATELLOFEMORAL PAIN SYNDROME OF BOTH KNEES: ICD-10-CM

## 2024-07-16 DIAGNOSIS — F31.9 BIPOLAR 1 DISORDER (HCC): ICD-10-CM

## 2024-07-16 DIAGNOSIS — F33.2 SEVERE EPISODE OF RECURRENT MAJOR DEPRESSIVE DISORDER, WITHOUT PSYCHOTIC FEATURES (HCC): ICD-10-CM

## 2024-07-16 DIAGNOSIS — Z00.00 ANNUAL PHYSICAL EXAM: Primary | ICD-10-CM

## 2024-07-16 DIAGNOSIS — M22.2X1 PATELLOFEMORAL PAIN SYNDROME OF BOTH KNEES: ICD-10-CM

## 2024-07-16 PROCEDURE — 99395 PREV VISIT EST AGE 18-39: CPT | Performed by: FAMILY MEDICINE

## 2024-07-16 RX ORDER — NAPROXEN 500 MG/1
500 TABLET ORAL 2 TIMES DAILY WITH MEALS
Qty: 60 TABLET | Refills: 0 | Status: SHIPPED | OUTPATIENT
Start: 2024-07-16 | End: 2024-08-15

## 2024-07-16 NOTE — PROGRESS NOTES
Adult Annual Physical  Name: Nafisa Lubin      : 2000      MRN: [id number]  Encounter Provider: Carol Rodríguez MD  Encounter Date: 2024   Encounter department: [facility]    Assessment & Plan   1. Annual physical exam  2. Bipolar 1 disorder (HCC)  Assessment & Plan:  Following with psychiatry in [place]. Off medications.  She was questioning this diagnosis of bipolar 1.  She returned to psychiatry for an autism evaluation but states she has not heard back yet.  3. Patellofemoral pain syndrome of both knees  -     naproxen (Naprosyn) 500 mg tablet; Take 1 tablet (500 mg total) by mouth 2 (two) times a day with meals  4. Severe episode of recurrent major depressive disorder, without psychotic features ([facility])    Psychiatrist: Dev. 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  Exercises provided.  Start naproxen 500 mg twice daily for the next week.  Take with food.  Can then use that as needed.  Recommend wearing knee pads while doing construction.  If no improvement will send to physical therapy.    Immunizations and preventive care screenings were discussed with patient today. Appropriate education was printed on patient's after visit summary.    Counseling:  Alcohol/drug use: discussed moderation in alcohol intake, the recommendations for healthy alcohol use, and avoidance of illicit drug use.  Dental Health: discussed importance of regular tooth brushing, flossing, and dental visits.  Injury prevention: discussed safety/seat belts, safety helmets, smoke detectors, carbon dioxide detectors, and smoking near bedding or upholstery.  Sexual health: discussed sexually transmitted diseases, partner selection, use of condoms, avoidance of unintended pregnancy, and contraceptive alternatives.  Exercise: the importance of regular exercise/physical activity was discussed. Recommend exercise 3-5 times per week  for at least 30 minutes.       Depression Screening and Follow-up Plan: Their PHQ-9 score was 17. Patient declines further evaluation by mental health professional and/or medications. Brief counseling provided. Will re-evaluate at next office visit.         History of Present Illness     Adult Annual Physical:  Patient presents for annual physical.     Diet and Physical Activity:  - Diet/Nutrition: well balanced diet and consuming 3-5 servings of fruits/vegetables daily.  - Exercise: walking.    Depression Screening:    - PHQ-9 Score: 17    General Health:  - Sleep: sleeps poorly and 4-6 hours of sleep on average.  - Hearing: normal hearing bilateral ears.  - Vision: wears glasses.  - Dental: regular dental visits.    /GYN Health:  - Follows with GYN: no.     Review of Systems   Constitutional:  Negative for activity change, fatigue and fever.   Eyes:  Negative for visual disturbance.   Respiratory:  Negative for shortness of breath.    Cardiovascular:  Negative for chest pain.   Gastrointestinal:  Negative for abdominal pain, constipation, diarrhea and nausea.   Endocrine: Negative for cold intolerance and heat intolerance.   Musculoskeletal:  Negative for back pain.        Bilateral knee pain     Skin:  Negative for rash.   Neurological:  Negative for headaches.   Psychiatric/Behavioral:  Negative for confusion.      Medical History Reviewed by provider this encounter:       Current Outpatient Medications on File Prior to Visit   Medication Sig Dispense Refill    [DISCONTINUED] Multiple Vitamins-Minerals (ONE-A-DAY WOMENS PO) Take by mouth      [DISCONTINUED] VITAMIN C, CALCIUM ASCORBATE, PO Take by mouth      [DISCONTINUED] cloNIDine (CATAPRES) 0.2 mg tablet TAKE 1 TABLET BY MOUTH ONCE A DAY BETWEEN MIDNIGHT AND 3 IN THE MORNING FOR INSOMNIA AS NEEDED (Patient not taking: Reported on 7/16/2024)      [DISCONTINUED] hydrOXYzine pamoate (VISTARIL) 25 mg capsule Take 25 mg by mouth daily (Patient not taking:  "Reported on 7/16/2024)      [DISCONTINUED] lamoTRIgine  MG TB24 Take 1 tablet by mouth every morning (Patient not taking: Reported on 7/16/2024)       No current facility-administered medications on file prior to visit.      Social History     Tobacco Use    Smoking status: Never    Smokeless tobacco: Never   Vaping Use    Vaping status: Some Days    Substances: THC   Substance and Sexual Activity    Alcohol use: Yes     Alcohol/week: 6.0 standard drinks of alcohol     Types: 6 Standard drinks or equivalent per week     Comment: Social    Drug use: Yes     Types: Marijuana     Comment: Medical marijuana     Sexual activity: Not Currently     Partners: Male     Birth control/protection: I.U.D.       Objective     /82 (BP Location: Left arm, Patient Position: Sitting, Cuff Size: Standard)   Pulse 89   Temp 98.7 °F (37.1 °C) (Temporal)   Resp 16   Ht 5' 4\" (1.626 m)   Wt 105 kg (232 lb)   SpO2 98%   BMI 39.82 kg/m²     Physical Exam  Vitals and nursing note reviewed.   Constitutional:       Appearance: Normal appearance. She is well-developed.   HENT:      Head: Normocephalic and atraumatic.   Cardiovascular:      Rate and Rhythm: Normal rate and regular rhythm.   Pulmonary:      Effort: Pulmonary effort is normal.      Breath sounds: Normal breath sounds.   Abdominal:      General: Bowel sounds are normal.      Palpations: Abdomen is soft.   Musculoskeletal:         General: Tenderness present.      Cervical back: Normal range of motion.   Skin:     General: Skin is warm and dry.   Neurological:      General: No focal deficit present.      Mental Status: She is alert and oriented to person, place, and time.   Psychiatric:         Mood and Affect: Mood normal.         Speech: Speech normal.         Behavior: Behavior normal.         "

## 2024-07-16 NOTE — ASSESSMENT & PLAN NOTE
Following with psychiatry in [place]. Off medications.  She was questioning this diagnosis of bipolar 1.  She returned to psychiatry for an autism evaluation but states she has not heard back yet.

## 2024-08-11 NOTE — PROGRESS NOTES
ASSESSMENT & PLAN: Nafisa Lubin is a 23 y.o.  with normal gynecologic exam.    1.  Routine well woman exam done today  2.  Pap:  The patient's last pap- 1st pap today.    Pap was done today.    Current ASCCP Guidelines reviewed.   3.  STD testing  was done culture for chlamydia and gonorrhea was done in the office  per pt consent and patient to complete STD blood work.  4.  Gardasil recommendations reviewed  is vaccinated.  5. The following were reviewed in today's visit: breast self exam, STD testing, adequate intake of calcium and vitamin D, exercise, healthy diet, and IUD.  6. Mirena IUD patient reports was placed in either  or , patient reports she has her IUD card and will check the date and call us.  Reviewed with patient her IUD is good for 8 years.   Patient unsure if she wants to have another 1 placed.  Patient may also be moving to [place] in October  7.  Patient scored an 11 on PHQ-9, met with  today.    BMI Counseling: Body mass index is 39.72 kg/m². The BMI is above normal. Nutrition recommendations include encouraging healthy choices of fruits and vegetables. Exercise recommendations include exercising 3-5 times per week. No pharmacotherapy was ordered. Rationale for BMI follow-up plan is due to patient being overweight or obese.     Depression Screening and Follow-up Plan: Their PHQ-9 score was 11.     Pt met with LUIS today, please see note.     CC:  Annual Gynecologic Examination    HPI: Nafisa Lubin is a 23 y.o.  who presents for annual gynecologic examination. She  has history of bipolar disorder.  She is a new patient to us.  She reports she has a Mirena IUD that was placed by [facility] in  or  she is unsure if she does have her Mirena IUD card at home and will call us.    She has been seen by bariatrics in the past.  She reports she needs to take an online class to finish her degree, she may also be moving to [place] in October.  She  has the following concerns: Currently not sexually active unsure if she wants to have another IUD placed, also is considering a tubal, she reports she does not want any children.  We  she discussed there is high regret when tubal is done early.   Health Maintenance:    She wears her seatbelt routinely.    She does perform regular monthly self breast exams.    She feels safe at home.     Past Medical History:   Diagnosis Date    Anxiety     Behavior problem in pediatric patient     Bipolar disorder (HCC)     Depression     History of emotional problems        History reviewed. No pertinent surgical history.    OB/Gyn History:    Pt does not have menstrual issues.  Amenorrheic with Mirena IUD  History of sexually transmitted infection: No.  History of abnormal pap smears: No, 1stPap done today.    Patient is not currently sexually active.    The current method of family planning is IUD.    OB History          0    Para   0    Term   0       0    AB   0    Living   0         SAB   0    IAB   0    Ectopic   0    Multiple   0    Live Births   0                 Family History   Problem Relation Age of Onset    Thyroid disease Mother     Heart disease Mother     Diabetes Father     Hypertension Father     No Known Problems Brother     No Known Problems Brother     No Known Problems Brother     Depression Maternal Grandmother     Lung cancer Maternal Grandmother     Diabetes Paternal Grandmother         prediabetes    Diabetes Paternal Grandfather     Depression Maternal Aunt     Breast cancer Maternal Aunt     Diabetes Paternal Aunt     Diabetes Paternal Uncle     Stroke Neg Hx     Colon cancer Neg Hx     Ovarian cancer Neg Hx        Social History:  Social History     Socioeconomic History    Marital status: Single     Spouse name: Not on file    Number of children: Not on file    Years of education: Not on file    Highest education level: Not on file   Occupational History    Not on file   Tobacco Use     Smoking status: Never    Smokeless tobacco: Never   Vaping Use    Vaping status: Every Day    Substances: THC   Substance and Sexual Activity    Alcohol use: Yes     Alcohol/week: 6.0 standard drinks of alcohol     Types: 6 Standard drinks or equivalent per week     Comment: Social    Drug use: Yes     Frequency: 7.0 times per week     Types: Marijuana     Comment: Medical marijuana     Sexual activity: Not Currently     Partners: Male     Birth control/protection: I.U.D., None     Comment: Per pt Mirena IUD inserted in 2015 or 17   Other Topics Concern    Not on file   Social History Narrative    Most recent tobacco use screenin2017    Sexual orientation: Homosexual    Sexually active: No     Social Determinants of Health     Financial Resource Strain: Low Risk  (2024)    Overall Financial Resource Strain (CARDIA)     Difficulty of Paying Living Expenses: Not hard at all   Food Insecurity: Not on file   Transportation Needs: No Transportation Needs (2024)    PRAPARE - Transportation     Lack of Transportation (Medical): No     Lack of Transportation (Non-Medical): No   Physical Activity: Not on file   Stress: Not on file   Social Connections: Not on file   Intimate Partner Violence: Not At Risk (2024)    Humiliation, Afraid, Rape, and Kick questionnaire     Fear of Current or Ex-Partner: No     Emotionally Abused: No     Physically Abused: No     Sexually Abused: No   Housing Stability: High Risk (2024)    Housing Stability Vital Sign     Unable to Pay for Housing in the Last Year: No     Number of Times Moved in the Last Year: 3     Homeless in the Last Year: No     Patient is single.  Patient is currently in school    No Known Allergies      Current Outpatient Medications:     naproxen (Naprosyn) 500 mg tablet, Take 1 tablet (500 mg total) by mouth 2 (two) times a day with meals (Patient not taking: Reported on 2024), Disp: 60 tablet, Rfl: 0    Review of Systems:  Constitutional  ":no fever, feels well, no tiredness, no recent weight gain or loss  ENT: no ear ache, no loss of hearing, no nosebleeds or nasal discharge, no sore throat or hoarseness.  Cardiovascular: no complaints of slow or fast heart beat, no chest pain, no palpitations, no leg claudication or lower extremity edema.  Respiratory: no complaints of shortness of shortness of breath, no KELLY  Breasts:no complaints of breast pain, breast lump, or nipple discharge  Gastrointestinal: no complaints of abdominal pain, constipation, nausea, vomiting, or diarrhea or bloody stools  Genitourinary : no complaints of dysuria, incontinence, pelvic pain, no dysmenorrhea, vaginal discharge or abnormal vaginal bleeding and as noted in HPI.  Musculoskeletal: no complaints of arthralgia, no myalgia, no joint swelling or stiffness, no limb pain or swelling.  Integumentary: no complaints of skin rash or lesion, itching or dry skin  Neurological: no complaints of headache, no confusion, no numbness or tingling, no dizziness or fainting    Objective      /82   Pulse 61   Ht 5' 4\" (1.626 m)   Wt 105 kg (231 lb 6.4 oz)   BMI 39.72 kg/m²     General:   appears stated age, cooperative, alert normal mood and affect   Neck: normal, supple,trachea midline, no masses   Heart: regular rate and rhythm, S1, S2 normal, no murmur, click, rub or gallop   Lungs: clear to auscultation bilaterally   Breasts: normal appearance, no masses or tenderness, Inspection negative, No nipple retraction or dimpling, No nipple discharge or bleeding, No axillary or supraclavicular adenopathy, Normal to palpation without dominant masses, Taught monthly breast self examination   Abdomen: soft, non-tender, without masses or organomegaly   Vulva: Bartholin's, Urethra, [place] normal   Vagina: normal vagina, no discharge, exudate, lesion, or erythema   Urethra: normal   Cervix: Normal, no discharge. PAP done. IUD strings present. GCC done.  IUD strings 2 cm in length   Uterus: " normal size, contour, position, consistency, mobility, non-tender, anteverted, and mobile   Adnexa: normal adnexa and no mass, fullness, tenderness   Lymphatic palpation of lymph nodes in neck, axilla, groin and/or other locations: no lymphadenopathy or masses noted   Skin normal skin turgor and no rashes.   Psychiatric orientation to person, place, and time: normal. mood and affect: normal

## 2024-08-12 ENCOUNTER — PATIENT OUTREACH (OUTPATIENT)
Dept: OBGYN CLINIC | Facility: CLINIC | Age: 24
End: 2024-08-12

## 2024-08-12 ENCOUNTER — OFFICE VISIT (OUTPATIENT)
Dept: OBGYN CLINIC | Facility: CLINIC | Age: 24
End: 2024-08-12

## 2024-08-12 VITALS
DIASTOLIC BLOOD PRESSURE: 82 MMHG | HEIGHT: 64 IN | HEART RATE: 61 BPM | BODY MASS INDEX: 39.5 KG/M2 | WEIGHT: 231.4 LBS | SYSTOLIC BLOOD PRESSURE: 132 MMHG

## 2024-08-12 DIAGNOSIS — Z97.5 IUD (INTRAUTERINE DEVICE) IN PLACE: ICD-10-CM

## 2024-08-12 DIAGNOSIS — Z13.31 POSITIVE DEPRESSION SCREENING: ICD-10-CM

## 2024-08-12 DIAGNOSIS — Z20.2 POSSIBLE EXPOSURE TO STD: ICD-10-CM

## 2024-08-12 DIAGNOSIS — Z01.419 ENCOUNTER FOR GYNECOLOGICAL EXAMINATION WITHOUT ABNORMAL FINDING: Primary | ICD-10-CM

## 2024-08-12 DIAGNOSIS — Z59.819 HOUSING INSTABILITY: ICD-10-CM

## 2024-08-12 PROCEDURE — G0145 SCR C/V CYTO,THINLAYER,RESCR: HCPCS | Performed by: NURSE PRACTITIONER

## 2024-08-12 PROCEDURE — S0610 ANNUAL GYNECOLOGICAL EXAMINA: HCPCS | Performed by: NURSE PRACTITIONER

## 2024-08-12 PROCEDURE — 87591 N.GONORRHOEAE DNA AMP PROB: CPT | Performed by: NURSE PRACTITIONER

## 2024-08-12 PROCEDURE — 87491 CHLMYD TRACH DNA AMP PROBE: CPT | Performed by: NURSE PRACTITIONER

## 2024-08-12 SDOH — ECONOMIC STABILITY - HOUSING INSECURITY: HOUSING INSTABILITY UNSPECIFIED: Z59.819

## 2024-08-12 NOTE — PROGRESS NOTES
SANG ULLOA  met with pt to address concerns for difficulties and housing. Pt is a 24yo Single  female primary language is English.     Pt reports a history of MH regarding depression and reports hospitalization when she was 17. Pt denies any hospitalizations as an adult. Pt denies SI and Pt reports history of SIB last done in May superficial cutting on the arms. Pt denies active medication but reports an open history with her psychiatrist with [facility]. Pt denies active concerns at this time. Pt reports that she is currently living with her mother and is debating on moving with her father soon. Pt reports that housing is stable and that either parent is willing to have her. Pt reports positive relationship with her family. Pt reports that she would like therapy but at this time is not Interested in paying the $40 copay requirement.     SANG ULLOA encouraged pt to contact SANG ULLOA for additional support if needed. SANG ULLOA will otherwise close this referral at this time.

## 2024-08-13 LAB
C TRACH DNA SPEC QL NAA+PROBE: NEGATIVE
N GONORRHOEA DNA SPEC QL NAA+PROBE: NEGATIVE

## 2024-08-14 ENCOUNTER — TELEPHONE (OUTPATIENT)
Dept: OBGYN CLINIC | Facility: CLINIC | Age: 24
End: 2024-08-14

## 2024-08-14 NOTE — TELEPHONE ENCOUNTER
----- Message from LUIS Meléndez sent at 8/13/2024  5:10 PM EDT -----  Please inform pt that her culture for chlamydia and gonorrhea were negative.  Thank You!

## 2024-08-14 NOTE — TELEPHONE ENCOUNTER
Left message referring pt to [facility] to view recent results. Office phone number provided in message.

## 2024-08-21 LAB
LAB AP GYN PRIMARY INTERPRETATION: NORMAL
Lab: NORMAL

## 2024-08-22 ENCOUNTER — TELEPHONE (OUTPATIENT)
Dept: OBGYN CLINIC | Facility: CLINIC | Age: 24
End: 2024-08-22

## 2024-08-22 NOTE — TELEPHONE ENCOUNTER
----- Message from LUIS Meléndez sent at 8/22/2024  9:25 AM EDT -----  Please inform pt that her pap was negative.  Thanks

## 2024-09-11 PROBLEM — Z01.419 ENCOUNTER FOR GYNECOLOGICAL EXAMINATION WITHOUT ABNORMAL FINDING: Status: RESOLVED | Noted: 2024-08-12 | Resolved: 2024-09-11

## 2025-01-06 ENCOUNTER — OFFICE VISIT (OUTPATIENT)
Dept: GYNECOLOGY | Facility: CLINIC | Age: 25
End: 2025-01-06
Payer: COMMERCIAL

## 2025-01-06 VITALS
HEIGHT: 64 IN | WEIGHT: 240 LBS | SYSTOLIC BLOOD PRESSURE: 146 MMHG | BODY MASS INDEX: 40.97 KG/M2 | DIASTOLIC BLOOD PRESSURE: 82 MMHG

## 2025-01-06 DIAGNOSIS — Z30.09 COUNSELING FOR BIRTH CONTROL, INTRAUTERINE DEVICE: Primary | ICD-10-CM

## 2025-01-06 PROCEDURE — 99203 OFFICE O/P NEW LOW 30 MIN: CPT | Performed by: OBSTETRICS & GYNECOLOGY

## 2025-01-06 RX ORDER — LAMOTRIGINE 100 MG/1
100 TABLET ORAL DAILY
COMMUNITY

## 2025-01-06 NOTE — PROGRESS NOTES
24-year-old G0 presents to the office requesting a more permanent solution than an IUD.  Patient has a Mirena IUD which was placed in 2016.  Understands that and is due for replacement.  Has had no issues.  Denies any abnormal bleeding or pelvic pain.  Considers herself to be nonbinary.  Patient is not interested in children in the future.  Was considering hysterectomy but leaving her ovaries.  Understands that her ovaries make her hormones and the uterus does not.  Not interested in transitioning at this time.  Not interested in a Nexplanon or Depo-Provera.  Normal Pap smear negative chlamydia and GC August 2024.  Patient agreed for me to refer her to a physician who can further discuss treatment for her request.    Impression: Request for hysterectomy.  An IUD placed since 2016.    Plan: Refer to GYN to discuss possible reinsertion of Mirena versus hysterectomy

## 2025-01-29 ENCOUNTER — OFFICE VISIT (OUTPATIENT)
Dept: OBGYN CLINIC | Facility: CLINIC | Age: 25
End: 2025-01-29
Payer: COMMERCIAL

## 2025-01-29 VITALS — BODY MASS INDEX: 41.15 KG/M2 | DIASTOLIC BLOOD PRESSURE: 74 MMHG | WEIGHT: 242 LBS | SYSTOLIC BLOOD PRESSURE: 116 MMHG

## 2025-01-29 DIAGNOSIS — Z97.5 IUD (INTRAUTERINE DEVICE) IN PLACE: ICD-10-CM

## 2025-01-29 DIAGNOSIS — Z30.09 CONSULTATION FOR STERILIZATION: Primary | ICD-10-CM

## 2025-01-29 PROCEDURE — 99214 OFFICE O/P EST MOD 30 MIN: CPT | Performed by: STUDENT IN AN ORGANIZED HEALTH CARE EDUCATION/TRAINING PROGRAM

## 2025-01-29 NOTE — H&P (VIEW-ONLY)
Name: Nafisa Lubin      : 2000      MRN: [id number]  Encounter Provider: Prema Vázquez MD  Encounter Date: 2025   Encounter department: [facility] [place]  :  Assessment & Plan  IUD (intrauterine device) in place  - due for replacement. Due to interest in amenorrhea, will plan to replace IUD at time of salpingectomy       Consultation for sterilization  - Desires bilateral salpingectomy for sterilization: we discussed that this is a permanent procedure, not reversible. Discussed that, after this procedure, IVF would be necessary for pregnancy in the future. We discussed risks, benefits, alternatives to bilateral salpingectomy. We discussed risks including bleeding, infection, damage to surrounding organs, DVT/PE, death. Minimal risks with blood transfusion to include transfusion reaction, TRALI, rare transmission of HIV/Hep C. Benefits of bilateral salpingectomy include sterilization and decreased risk ovarian cancer. Patient wishes to proceed. Consent form signed.              History of Present Illness   HPI  Nafisa Lubin is a 24 y.o. adult who presents for consultation regarding sterilization. Nafisa has been certain that she does not want to bear children since her teen years. She had an IUD placed at around age 17, which is now . She has had amenorrhea with this, continues as such. Nafisa experiences gender dysphoria, does not prefer specific pronouns, but much prefers not having a menstrual cycle. Therefore would like salpingectomy for sterilization, but also an IUD replacement.         Review of Systems as above       Objective   /74 (BP Location: Left arm, Patient Position: Sitting, Cuff Size: Large)   Wt 110 kg (242 lb)   LMP  (LMP Unknown)   BMI 41.15 kg/m²      Physical Exam  Constitutional:       Appearance: Nafisa is well-developed.   HENT:      Head: Normocephalic and atraumatic.   Eyes:      Pupils: Pupils are equal, round,  and reactive to light.   Cardiovascular:      Rate and Rhythm: Normal rate.   Pulmonary:      Effort: Pulmonary effort is normal.   Abdominal:      Palpations: Abdomen is soft.   Musculoskeletal:      Cervical back: Normal range of motion.   Neurological:      Mental Status: Nafisa is alert and oriented to person, place, and time.   Psychiatric:         Behavior: Behavior normal.

## 2025-01-29 NOTE — ASSESSMENT & PLAN NOTE
- due for replacement. Due to interest in amenorrhea, will plan to replace IUD at time of salpingectomy

## 2025-01-29 NOTE — PROGRESS NOTES
Name: Nafisa Lubin      : 2000      MRN: [id number]  Encounter Provider: Prema Vázquez MD  Encounter Date: 2025   Encounter department: [facility] [place]  :  Assessment & Plan  IUD (intrauterine device) in place  - due for replacement. Due to interest in amenorrhea, will plan to replace IUD at time of salpingectomy       Consultation for sterilization  - Desires bilateral salpingectomy for sterilization: we discussed that this is a permanent procedure, not reversible. Discussed that, after this procedure, IVF would be necessary for pregnancy in the future. We discussed risks, benefits, alternatives to bilateral salpingectomy. We discussed risks including bleeding, infection, damage to surrounding organs, DVT/PE, death. Minimal risks with blood transfusion to include transfusion reaction, TRALI, rare transmission of HIV/Hep C. Benefits of bilateral salpingectomy include sterilization and decreased risk ovarian cancer. Patient wishes to proceed. Consent form signed.              History of Present Illness   HPI  Nafisa Lubin is a 24 y.o. adult who presents for consultation regarding sterilization. Nafisa has been certain that she does not want to bear children since her teen years. She had an IUD placed at around age 17, which is now . She has had amenorrhea with this, continues as such. Nafisa experiences gender dysphoria, does not prefer specific pronouns, but much prefers not having a menstrual cycle. Therefore would like salpingectomy for sterilization, but also an IUD replacement.         Review of Systems as above       Objective   /74 (BP Location: Left arm, Patient Position: Sitting, Cuff Size: Large)   Wt 110 kg (242 lb)   LMP  (LMP Unknown)   BMI 41.15 kg/m²      Physical Exam  Constitutional:       Appearance: Nafisa is well-developed.   HENT:      Head: Normocephalic and atraumatic.   Eyes:      Pupils: Pupils are equal, round,  and reactive to light.   Cardiovascular:      Rate and Rhythm: Normal rate.   Pulmonary:      Effort: Pulmonary effort is normal.   Abdominal:      Palpations: Abdomen is soft.   Musculoskeletal:      Cervical back: Normal range of motion.   Neurological:      Mental Status: Nafisa is alert and oriented to person, place, and time.   Psychiatric:         Behavior: Behavior normal.

## 2025-01-31 ENCOUNTER — TELEPHONE (OUTPATIENT)
Dept: OBGYN CLINIC | Facility: CLINIC | Age: 25
End: 2025-01-31

## 2025-01-31 NOTE — TELEPHONE ENCOUNTER
Talked to patient she is scheduled for her surgical procedure on 2/21/2025 with Dr. Vázquez  at the [facility]. Patient aware of lab work and EKG needed prior to her surgical procedure.

## 2025-01-31 NOTE — TELEPHONE ENCOUNTER
----- Message from Prema Vázquez MD sent at 2025 10:59 AM EST -----  [facility] GYN Department  Surgery Scheduling Sheet    Patient Name: Nafisa Lubin  : 2000    Provider: Prema Vázquez MD     Needed: no; Language: N/A    Procedure: exam under anesthesia and bilateral salpingectomy, mirena IUD removal and replacement    Diagnosis: desire for sterilization    Special Needs or Equipment: none    Anesthesia: General anesthesia    Length of stay: outpatient  Does patient have comorbid conditions that will require close perioperative monitoring prior to safe discharge: no    The patient has comorbid conditions that will require close perioperative monitoring prior to safe discharge, including N/A.   This may require acute care beyond the usual and routine recovery period. As such, inpatient admission post-operatively is expected and appropriate, and anticipated hospital length of stay will be >2 midnights.    Pre-Admission Testing Needed: yes   Labs that should be ordered: cbc    Order PAT that is recommended in prep for procedure?: Yes    Medical Clearance Needed: no; Provider: N/A    MA Form Signed (tubals/hysterectomy): Not Indicated    Surgical Drink Given: yes     How many days out of work: 2 week(s)     How many days no drivin day(s)       Is pre op appt needed?  no  Interval for post op appt: 2 week(s)       For Surgical Scheduler:     Surgery Scheduled On:  [place]: [facility], [facility], [facility], and [facility]    Pre-op Appt:   Post op Appt:  Consult/Medical clearance appt:

## 2025-02-03 ENCOUNTER — CLINICAL SUPPORT (OUTPATIENT)
Dept: FAMILY MEDICINE CLINIC | Facility: CLINIC | Age: 25
End: 2025-02-03
Payer: COMMERCIAL

## 2025-02-03 DIAGNOSIS — Z11.1 ENCOUNTER FOR PPD TEST: Primary | ICD-10-CM

## 2025-02-03 PROCEDURE — 86580 TB INTRADERMAL TEST: CPT | Performed by: FAMILY MEDICINE

## 2025-02-03 NOTE — PROGRESS NOTES
Patient came into office today 2/3/25 for a ppd test for work, she doesn't have any forms that needs to filled out.  Patient will be coming to have ppd test read on 3/5/25 and wants the results printed out for her job.    PPD Test was placed on left lower forearm.

## 2025-02-05 ENCOUNTER — CLINICAL SUPPORT (OUTPATIENT)
Dept: FAMILY MEDICINE CLINIC | Facility: CLINIC | Age: 25
End: 2025-02-05

## 2025-02-05 DIAGNOSIS — Z11.1 ENCOUNTER FOR PPD TEST: Primary | ICD-10-CM

## 2025-02-05 LAB
INDURATION: 0 MM
TB SKIN TEST: NEGATIVE

## 2025-02-06 ENCOUNTER — ANESTHESIA EVENT (OUTPATIENT)
Dept: ANESTHESIOLOGY | Facility: HOSPITAL | Age: 25
End: 2025-02-06

## 2025-02-06 ENCOUNTER — ANESTHESIA (OUTPATIENT)
Dept: ANESTHESIOLOGY | Facility: HOSPITAL | Age: 25
End: 2025-02-06

## 2025-02-14 ENCOUNTER — APPOINTMENT (OUTPATIENT)
Dept: LAB | Age: 25
End: 2025-02-14
Payer: COMMERCIAL

## 2025-02-14 ENCOUNTER — APPOINTMENT (OUTPATIENT)
Dept: URGENT CARE | Age: 25
End: 2025-02-14
Payer: COMMERCIAL

## 2025-02-14 DIAGNOSIS — Z01.818 PRE-OP TESTING: ICD-10-CM

## 2025-02-14 LAB
ANION GAP SERPL CALCULATED.3IONS-SCNC: 12 MMOL/L (ref 4–13)
BUN SERPL-MCNC: 13 MG/DL (ref 5–25)
CALCIUM SERPL-MCNC: 9.4 MG/DL (ref 8.4–10.2)
CHLORIDE SERPL-SCNC: 103 MMOL/L (ref 96–108)
CO2 SERPL-SCNC: 25 MMOL/L (ref 21–32)
CREAT SERPL-MCNC: 0.65 MG/DL (ref 0.6–1.3)
ERYTHROCYTE [DISTWIDTH] IN BLOOD BY AUTOMATED COUNT: 12.7 % (ref 11.6–15.1)
GLUCOSE SERPL-MCNC: 77 MG/DL (ref 65–140)
HCT VFR BLD AUTO: 41.1 % (ref 36.5–46.1)
HGB BLD-MCNC: 13.5 G/DL (ref 12–15.4)
MCH RBC QN AUTO: 29.5 PG (ref 26.8–34.3)
MCHC RBC AUTO-ENTMCNC: 32.8 G/DL (ref 31.4–37.4)
MCV RBC AUTO: 90 FL (ref 82–98)
PLATELET # BLD AUTO: 370 THOUSANDS/UL (ref 149–390)
PMV BLD AUTO: 10.6 FL (ref 8.9–12.7)
POTASSIUM SERPL-SCNC: 3.8 MMOL/L (ref 3.5–5.3)
RBC # BLD AUTO: 4.57 MILLION/UL (ref 3.88–5.12)
SODIUM SERPL-SCNC: 140 MMOL/L (ref 135–147)
WBC # BLD AUTO: 9.62 THOUSAND/UL (ref 4.31–10.16)

## 2025-02-14 PROCEDURE — 36415 COLL VENOUS BLD VENIPUNCTURE: CPT

## 2025-02-14 PROCEDURE — 80048 BASIC METABOLIC PNL TOTAL CA: CPT

## 2025-02-14 PROCEDURE — 85027 COMPLETE CBC AUTOMATED: CPT

## 2025-02-14 PROCEDURE — 93005 ELECTROCARDIOGRAM TRACING: CPT

## 2025-02-18 LAB
ATRIAL RATE: 66 BPM
QRS AXIS: -18 DEGREES
QRSD INTERVAL: 80 MS
QT INTERVAL: 374 MS
QTC INTERVAL: 404 MS
T WAVE AXIS: -12 DEGREES
VENTRICULAR RATE: 70 BPM

## 2025-02-18 PROCEDURE — 93010 ELECTROCARDIOGRAM REPORT: CPT | Performed by: INTERNAL MEDICINE

## 2025-02-21 ENCOUNTER — ANESTHESIA (OUTPATIENT)
Dept: PERIOP | Facility: AMBULARY SURGERY CENTER | Age: 25
End: 2025-02-21
Payer: COMMERCIAL

## 2025-02-21 ENCOUNTER — HOSPITAL ENCOUNTER (OUTPATIENT)
Facility: AMBULARY SURGERY CENTER | Age: 25
Setting detail: OUTPATIENT SURGERY
Discharge: HOME | End: 2025-02-21
Attending: STUDENT IN AN ORGANIZED HEALTH CARE EDUCATION/TRAINING PROGRAM | Admitting: STUDENT IN AN ORGANIZED HEALTH CARE EDUCATION/TRAINING PROGRAM
Payer: COMMERCIAL

## 2025-02-21 ENCOUNTER — ANESTHESIA EVENT (OUTPATIENT)
Dept: PERIOP | Facility: AMBULARY SURGERY CENTER | Age: 25
End: 2025-02-21
Payer: COMMERCIAL

## 2025-02-21 VITALS
TEMPERATURE: 97.4 F | RESPIRATION RATE: 17 BRPM | OXYGEN SATURATION: 98 % | HEIGHT: 65 IN | HEART RATE: 88 BPM | SYSTOLIC BLOOD PRESSURE: 133 MMHG | BODY MASS INDEX: 39.82 KG/M2 | DIASTOLIC BLOOD PRESSURE: 74 MMHG | WEIGHT: 239 LBS

## 2025-02-21 DIAGNOSIS — Z30.2 REQUEST FOR STERILIZATION: ICD-10-CM

## 2025-02-21 DIAGNOSIS — Z90.79 STATUS POST BILATERAL SALPINGECTOMY: Primary | ICD-10-CM

## 2025-02-21 LAB
EXT PREGNANCY TEST URINE: NEGATIVE
EXT. CONTROL: NORMAL

## 2025-02-21 PROCEDURE — 58661 LAPAROSCOPY REMOVE ADNEXA: CPT | Performed by: STUDENT IN AN ORGANIZED HEALTH CARE EDUCATION/TRAINING PROGRAM

## 2025-02-21 PROCEDURE — 58300 INSERT INTRAUTERINE DEVICE: CPT | Performed by: STUDENT IN AN ORGANIZED HEALTH CARE EDUCATION/TRAINING PROGRAM

## 2025-02-21 PROCEDURE — 81025 URINE PREGNANCY TEST: CPT | Performed by: STUDENT IN AN ORGANIZED HEALTH CARE EDUCATION/TRAINING PROGRAM

## 2025-02-21 PROCEDURE — 58301 REMOVE INTRAUTERINE DEVICE: CPT | Performed by: STUDENT IN AN ORGANIZED HEALTH CARE EDUCATION/TRAINING PROGRAM

## 2025-02-21 PROCEDURE — 88302 TISSUE EXAM BY PATHOLOGIST: CPT | Performed by: PATHOLOGY

## 2025-02-21 RX ORDER — IBUPROFEN 600 MG/1
600 TABLET, FILM COATED ORAL EVERY 6 HOURS PRN
Status: DISCONTINUED | OUTPATIENT
Start: 2025-02-21 | End: 2025-02-21 | Stop reason: HOSPADM

## 2025-02-21 RX ORDER — KETOROLAC TROMETHAMINE 30 MG/ML
INJECTION, SOLUTION INTRAMUSCULAR; INTRAVENOUS AS NEEDED
Status: DISCONTINUED | OUTPATIENT
Start: 2025-02-21 | End: 2025-02-21

## 2025-02-21 RX ORDER — LIDOCAINE HYDROCHLORIDE 20 MG/ML
INJECTION, SOLUTION EPIDURAL; INFILTRATION; INTRACAUDAL; PERINEURAL AS NEEDED
Status: DISCONTINUED | OUTPATIENT
Start: 2025-02-21 | End: 2025-02-21

## 2025-02-21 RX ORDER — ONDANSETRON 2 MG/ML
4 INJECTION INTRAMUSCULAR; INTRAVENOUS EVERY 6 HOURS PRN
Status: DISCONTINUED | OUTPATIENT
Start: 2025-02-21 | End: 2025-02-21 | Stop reason: HOSPADM

## 2025-02-21 RX ORDER — ONDANSETRON 2 MG/ML
4 INJECTION INTRAMUSCULAR; INTRAVENOUS ONCE AS NEEDED
Status: DISCONTINUED | OUTPATIENT
Start: 2025-02-21 | End: 2025-02-21 | Stop reason: HOSPADM

## 2025-02-21 RX ORDER — MAGNESIUM HYDROXIDE 1200 MG/15ML
LIQUID ORAL AS NEEDED
Status: DISCONTINUED | OUTPATIENT
Start: 2025-02-21 | End: 2025-02-21 | Stop reason: HOSPADM

## 2025-02-21 RX ORDER — FENTANYL CITRATE/PF 50 MCG/ML
50 SYRINGE (ML) INJECTION
Status: DISCONTINUED | OUTPATIENT
Start: 2025-02-21 | End: 2025-02-21 | Stop reason: HOSPADM

## 2025-02-21 RX ORDER — SODIUM CHLORIDE, SODIUM LACTATE, POTASSIUM CHLORIDE, CALCIUM CHLORIDE 600; 310; 30; 20 MG/100ML; MG/100ML; MG/100ML; MG/100ML
INJECTION, SOLUTION INTRAVENOUS CONTINUOUS PRN
Status: DISCONTINUED | OUTPATIENT
Start: 2025-02-21 | End: 2025-02-21

## 2025-02-21 RX ORDER — FENTANYL CITRATE 50 UG/ML
INJECTION, SOLUTION INTRAMUSCULAR; INTRAVENOUS AS NEEDED
Status: DISCONTINUED | OUTPATIENT
Start: 2025-02-21 | End: 2025-02-21

## 2025-02-21 RX ORDER — MIDAZOLAM HYDROCHLORIDE 2 MG/2ML
INJECTION, SOLUTION INTRAMUSCULAR; INTRAVENOUS AS NEEDED
Status: DISCONTINUED | OUTPATIENT
Start: 2025-02-21 | End: 2025-02-21

## 2025-02-21 RX ORDER — ROCURONIUM BROMIDE 10 MG/ML
INJECTION, SOLUTION INTRAVENOUS AS NEEDED
Status: DISCONTINUED | OUTPATIENT
Start: 2025-02-21 | End: 2025-02-21

## 2025-02-21 RX ORDER — ACETAMINOPHEN 500 MG
1000 TABLET ORAL EVERY 8 HOURS PRN
Qty: 30 TABLET | Refills: 0 | Status: SHIPPED | OUTPATIENT
Start: 2025-02-21

## 2025-02-21 RX ORDER — PROPOFOL 10 MG/ML
INJECTION, EMULSION INTRAVENOUS AS NEEDED
Status: DISCONTINUED | OUTPATIENT
Start: 2025-02-21 | End: 2025-02-21

## 2025-02-21 RX ORDER — ACETAMINOPHEN 325 MG/1
975 TABLET ORAL EVERY 6 HOURS PRN
Status: DISCONTINUED | OUTPATIENT
Start: 2025-02-21 | End: 2025-02-21 | Stop reason: HOSPADM

## 2025-02-21 RX ORDER — BUPIVACAINE HYDROCHLORIDE 2.5 MG/ML
INJECTION, SOLUTION EPIDURAL; INFILTRATION; INTRACAUDAL AS NEEDED
Status: DISCONTINUED | OUTPATIENT
Start: 2025-02-21 | End: 2025-02-21 | Stop reason: HOSPADM

## 2025-02-21 RX ORDER — ONDANSETRON 2 MG/ML
INJECTION INTRAMUSCULAR; INTRAVENOUS AS NEEDED
Status: DISCONTINUED | OUTPATIENT
Start: 2025-02-21 | End: 2025-02-21

## 2025-02-21 RX ORDER — SODIUM CHLORIDE, SODIUM LACTATE, POTASSIUM CHLORIDE, CALCIUM CHLORIDE 600; 310; 30; 20 MG/100ML; MG/100ML; MG/100ML; MG/100ML
20 INJECTION, SOLUTION INTRAVENOUS CONTINUOUS
Status: DISCONTINUED | OUTPATIENT
Start: 2025-02-21 | End: 2025-02-21 | Stop reason: HOSPADM

## 2025-02-21 RX ORDER — IBUPROFEN 800 MG/1
800 TABLET, FILM COATED ORAL EVERY 8 HOURS PRN
Qty: 30 TABLET | Refills: 0 | Status: SHIPPED | OUTPATIENT
Start: 2025-02-21

## 2025-02-21 RX ADMIN — ROCURONIUM BROMIDE 50 MG: 10 INJECTION, SOLUTION INTRAVENOUS at 13:23

## 2025-02-21 RX ADMIN — FENTANYL CITRATE 50 MCG: 50 INJECTION, SOLUTION INTRAMUSCULAR; INTRAVENOUS at 13:25

## 2025-02-21 RX ADMIN — MIDAZOLAM HYDROCHLORIDE 2 MG: 2 INJECTION, SOLUTION INTRAMUSCULAR; INTRAVENOUS at 13:13

## 2025-02-21 RX ADMIN — PROPOFOL 50 MG: 10 INJECTION, EMULSION INTRAVENOUS at 13:22

## 2025-02-21 RX ADMIN — SODIUM CHLORIDE, SODIUM LACTATE, POTASSIUM CHLORIDE, CALCIUM CHLORIDE: 600; 310; 30; 20 INJECTION, SOLUTION INTRAVENOUS at 13:17

## 2025-02-21 RX ADMIN — ACETAMINOPHEN 975 MG: 325 TABLET, FILM COATED ORAL at 15:11

## 2025-02-21 RX ADMIN — FENTANYL CITRATE 50 MCG: 50 INJECTION, SOLUTION INTRAMUSCULAR; INTRAVENOUS at 13:21

## 2025-02-21 RX ADMIN — LIDOCAINE HYDROCHLORIDE 100 MG: 20 INJECTION, SOLUTION EPIDURAL; INFILTRATION; INTRACAUDAL; PERINEURAL at 13:21

## 2025-02-21 RX ADMIN — ONDANSETRON 4 MG: 2 INJECTION INTRAMUSCULAR; INTRAVENOUS at 13:25

## 2025-02-21 RX ADMIN — PROPOFOL 50 MG: 10 INJECTION, EMULSION INTRAVENOUS at 13:24

## 2025-02-21 RX ADMIN — KETOROLAC TROMETHAMINE 30 MG: 30 INJECTION, SOLUTION INTRAMUSCULAR; INTRAVENOUS at 14:12

## 2025-02-21 RX ADMIN — PROPOFOL 50 MG: 10 INJECTION, EMULSION INTRAVENOUS at 13:23

## 2025-02-21 RX ADMIN — PROPOFOL 50 MG: 10 INJECTION, EMULSION INTRAVENOUS at 13:21

## 2025-02-21 NOTE — OP NOTE
OPERATIVE REPORT  PATIENT NAME: Nafisa Lubin    :  2000  MRN: [id number]  Pt Location: [facility] OR ROOM 05    SURGERY DATE: 2025    Surgeons and Role:     * Prema Vázquez MD - Primary     * Marquis Barahona MD - Assisting    Preop Diagnosis:  Request for sterilization [Z30.2]    Post-Op Diagnosis Codes:     * Request for sterilization [Z30.2]    Procedure(s):  Bilateral - LAPAROSCOPIC BILATERAL SALPINGECTOMY. EXAM UNDER ANESTHESIA  MIRENA IUD REMOVAL  MIRENA IUD REPLACEMENT    Specimen(s):  ID Type Source Tests Collected by Time Destination   1 :  Tissue Fallopian Tubes, Bilateral TISSUE EXAM Prema Vázquez MD 2025 1349        Estimated Blood Loss:   Minimal    Drains:  * No LDAs found *    Anesthesia Type:   General    Operative Indications:  Request for sterilization [Z30.2]    Operative Findings:  Normal appearing external genitalia  Bimanual exam with midposition uterus, normal in size and consistency  Laparoscopy with normal appearing liver, uterus, bilateral Fallopian tubes and ovaries  Bilateral salpingectomy performed with Harmonic scalpel with specimen sent to pathology  Excellent hemostasis appreciated from vascular pedicles  IUD removed with new IUD inserted without complication  - LOT: [id number]  - EXP: 2027      Complications:   None apparent    Procedure and Technique:  The correct patient and procedure were identified in pre-op holding and again in the operating room. General endotracheal anesthesia (GET) was administered and the patient was positioned on the OR table in the dorsal lithotomy position. All pressure points were padded and a randall hugger was placed to maintain control of core body temperature. A bimanual exam was performed and the uterus was noted to be midposition, normal in size and consistency with no palpable adnexal masses or fullness. Chlorhexidine was used to prep the vagina and perineum. Chloraprep was used to prep the abdomen. The patient was draped in the  usual sterile fashion. A surgical timeout was performed and all were in agreement.    A sponge stick was inserted into the vagina. Sterile gloves were then exchanged and attention was turned to the abdomen.     The umbilicus was grasped with penetrating towel clamps for traction and infiltrated with 0.25% bupivacaine. A Veress needle was inserted through the base of the umbilicus but was unable to penetrate the fascia secondary to central obesity. A 5mm incision was made at the inferior edge of the umbilicus for introduction of a 5mm trocar. Trocar was introduced under direct visualization. Pneumoperitoneum was then established to a maximum of 15mmHg.  There was no evidence of injury directly below the trocar insertion site. Attention was then turned to the pelvis.    Patient was placed in Trendelenburg and the uterus was elevated to visualize the fallopian tubes. One additional 5mm port site was inserted suprapubically for completion of the procedure. Findings noted as above.    The left fallopian tube was grasped at its fimbriated end with a blunt grasper and elevated to visualize the mesosalpinx. The Harmonic scalpel was used to ligate along the mesosalpinx, working proximally and taking care to avoid ovarian vasculature. At the uterine cornua, the Harmonic was used to amputate fallopian tube. This was then withdrawn from the abdominal cavity and sent for pathology. Attention was then turned to the contralateral tube, which was amputated in similar fashion. Good hemostasis was confirmed following salpingectomy.  The entire abdomen and pelvis was inspected and there was no evidence of injury to bowel, bladder, vasculature, or other structures.     Pneumoperitoneum was allowed to escape. Adequate hemostasis was visualized including at the trocar sites. The inferior trocars were removed under direct visualization. The laparoscope was withdrawn from the abdomen, followed by its trocar sleeve at the umbilicus. Skin  incisions were closed with 4-0 Monocryl and dressed with Exofin.    Attention was turned to the vagina. The sponge stick was removed. IUD strings were visualized, grasped with ring forceps, and the IUD was removed in its entirety. A new device was selected with information as above. This was inserted into the uterus with the introducer and deployed at the uterine fundus without complication. The IUD strings were then trimmed 2cm from the external os. All instrumentation was removed.    The patient was cleansed and awakened from anesthesia. At the conclusion of the procedure, all needle, sponge, and instrument counts were noted to be correct x2. Patient tolerated the procedure well and was transferred to PACU in stable condition.    Dr. Vázquez was present and participated in all key portions of the case.         Patient Disposition:  PACU              SIGNATURE: Marquis Barahona MD  DATE: February 21, 2025  TIME: 2:28 PM

## 2025-02-21 NOTE — INTERVAL H&P NOTE
H&P reviewed. After examining the patient I find no changes in the patients condition since the H&P had been written.    Vitals:    02/21/25 1103   BP: 147/91   Pulse: 88   Resp: 18   Temp: 97.6 °F (36.4 °C)   SpO2: 98%

## 2025-02-21 NOTE — ANESTHESIA PREPROCEDURE EVALUATION
Procedure:  LAPAROSCOPIC BILATERAL SALPINGECTOMY, EXAM UNDER ANESTHESIA (Bilateral: Pelvis)  MIRENA IUD REMOVAL (Uterus)  MIRENA IUD REPLACEMENT (Uterus)    Bipolar disorder on lamictal  Heavy etoh use  Marijuana use - stopped 5-6 weeks ago  BMI 40  Relevant Problems   ENDO   (+) Subclinical hyperthyroidism      NEURO/PSYCH   (+) MDD (major depressive disorder), recurrent episode, severe (HCC)   (+) Panic disorder        Physical Exam    Airway    Mallampati score: II  TM Distance: >3 FB  Neck ROM: full     Dental   No notable dental hx     Cardiovascular  Cardiovascular exam normal    Pulmonary  Pulmonary exam normal     Other Findings        Anesthesia Plan  ASA Score- 3     Anesthesia Type- general with ASA Monitors.         Additional Monitors:     Airway Plan: ETT.    Comment: Risks/benefits and alternatives discussed with patient including possible PONV, sore throat, damage to teeth/lips/gums/esophagus, and possibility of rare anesthetic and surgical emergencies including but not limited to heart attack, stroke, and/or death. All questions were answered.  .       Plan Factors-Exercise tolerance (METS): >4 METS.    Chart reviewed.   Existing labs reviewed. Patient summary reviewed.    Patient is a current smoker.  Patient did not smoke on day of surgery.            Induction- intravenous.    Postoperative Plan- Plan for postoperative opioid use. Planned trial extubation        Informed Consent- Anesthetic plan and risks discussed with patient.  I personally reviewed this patient with the CRNA. Discussed and agreed on the Anesthesia Plan with the CRNA..      NPO Status:  Vitals Value Taken Time   Date of last liquid 02/20/25 02/21/25 1102   Time of last liquid 1900 02/21/25 1102   Date of last solid 02/20/25 02/21/25 1102   Time of last solid 1900 02/21/25 1102

## 2025-02-21 NOTE — ANESTHESIA POSTPROCEDURE EVALUATION
Post-Op Assessment Note    CV Status:  Stable    Pain management: adequate       Mental Status:  Awake   Hydration Status:  Euvolemic   PONV Controlled:  Controlled   Airway Patency:  Patent     Post Op Vitals Reviewed: Yes    No anethesia notable event occurred.    Staff: Anesthesiologist           Last Filed PACU Vitals:  Vitals Value Taken Time   Temp 98.6 °F (37 °C) 02/21/25 1445   Pulse 79 02/21/25 1445   /76 02/21/25 1445   Resp 19 02/21/25 1445   SpO2 99 % 02/21/25 1445       Modified Peter:     Vitals Value Taken Time   Activity 2 02/21/25 1445   Respiration 2 02/21/25 1445   Circulation 2 02/21/25 1445   Consciousness 2 02/21/25 1445   Oxygen Saturation 2 02/21/25 1445     Modified Peter Score: 10

## 2025-02-21 NOTE — DISCHARGE INSTR - AVS FIRST PAGE
Call the office if you have any:  Fevers or chills  Worsening abdominal pain after taking medication  Heavy vaginal bleeding or large passage of clots  Persistent nausea or vomiting  Or any concerns after surgery    Plan to follow-up in the office in 1-2 weeks.

## 2025-02-25 PROCEDURE — 88302 TISSUE EXAM BY PATHOLOGIST: CPT | Performed by: PATHOLOGY

## 2025-03-27 ENCOUNTER — TELEPHONE (OUTPATIENT)
Age: 25
End: 2025-03-27

## 2025-03-27 NOTE — TELEPHONE ENCOUNTER
Patient called to schedule post op visit. Patient had procedure done on 2/21/25 with Dr. Chan. Please advise patient can be reached at [phone].

## 2025-03-28 ENCOUNTER — OFFICE VISIT (OUTPATIENT)
Dept: FAMILY MEDICINE CLINIC | Facility: CLINIC | Age: 25
End: 2025-03-28
Payer: COMMERCIAL

## 2025-03-28 VITALS
HEIGHT: 65 IN | TEMPERATURE: 98.2 F | OXYGEN SATURATION: 98 % | WEIGHT: 251.5 LBS | SYSTOLIC BLOOD PRESSURE: 136 MMHG | RESPIRATION RATE: 16 BRPM | DIASTOLIC BLOOD PRESSURE: 88 MMHG | HEART RATE: 96 BPM | BODY MASS INDEX: 41.9 KG/M2

## 2025-03-28 DIAGNOSIS — F31.9 BIPOLAR 1 DISORDER (HCC): ICD-10-CM

## 2025-03-28 DIAGNOSIS — Z23 NEED FOR COVID-19 VACCINE: ICD-10-CM

## 2025-03-28 DIAGNOSIS — Z00.00 ANNUAL PHYSICAL EXAM: Primary | ICD-10-CM

## 2025-03-28 DIAGNOSIS — Z23 ENCOUNTER FOR IMMUNIZATION: ICD-10-CM

## 2025-03-28 PROCEDURE — 91320 SARSCV2 VAC 30MCG TRS-SUC IM: CPT | Performed by: FAMILY MEDICINE

## 2025-03-28 PROCEDURE — 99395 PREV VISIT EST AGE 18-39: CPT | Performed by: FAMILY MEDICINE

## 2025-03-28 PROCEDURE — 90656 IIV3 VACC NO PRSV 0.5 ML IM: CPT | Performed by: FAMILY MEDICINE

## 2025-03-28 PROCEDURE — 90471 IMMUNIZATION ADMIN: CPT | Performed by: FAMILY MEDICINE

## 2025-03-28 PROCEDURE — 90480 ADMN SARSCOV2 VAC 1/ONLY CMP: CPT | Performed by: FAMILY MEDICINE

## 2025-03-28 NOTE — PROGRESS NOTES
Adult Annual Physical  Name: Nafisa Lubin      : 2000      MRN: [id number]  Encounter Provider: Carol Rodríguez MD  Encounter Date: 3/28/2025   Encounter department: [facility]    Assessment & Plan  Annual physical exam         Encounter for immunization    Orders:    influenza vaccine preservative-free 0.5 mL IM (Fluzone, Afluria, Fluarix, Flulaval)    Need for COVID-19 vaccine    Orders:    COVID-19 Pfizer mRNA vaccine 12 yr and older (Comirnaty pre-filled syringe)    Bipolar 1 disorder (HCC)    Orders:    Ambulatory referral to Psych Services; Future    Preventive Screenings:    - Cervical cancer screening: screening up-to-date     Immunizations:  - Immunizations due: Tdap and HPV (Gardasil 9)         History of Present Illness     Adult Annual Physical:  Patient presents for annual physical. Patient presents today for a work physical.  She will be starting as a  at [employer].  She is requesting a COVID vaccination today..     Diet and Physical Activity:  - Diet/Nutrition: well balanced diet.  - Exercise: walking.    General Health:  - Sleep: sleeps well.  - Hearing: normal hearing bilateral ears.  - Vision: no vision problems.  - Dental: regular dental visits.    /GYN Health:  - Follows with GYN: yes.     Review of Systems   Constitutional:  Negative for activity change, fatigue and fever.   Eyes:  Negative for visual disturbance.   Respiratory:  Negative for shortness of breath.    Cardiovascular:  Negative for chest pain.   Gastrointestinal:  Negative for abdominal pain, constipation, diarrhea and nausea.   Endocrine: Negative for cold intolerance and heat intolerance.   Musculoskeletal:  Negative for back pain.   Skin:  Negative for rash.   Neurological:  Negative for headaches.   Psychiatric/Behavioral:  Negative for confusion.      Medical History Reviewed by provider this encounter:  Tobacco  Allergies  Meds  Problems  Med Hx  Surg Hx  Fam  "Hx     .  Current Outpatient Medications on File Prior to Visit   Medication Sig Dispense Refill    acetaminophen (TYLENOL) 500 mg tablet Take 2 tablets (1,000 mg total) by mouth every 8 (eight) hours as needed for mild pain (Patient not taking: Reported on 3/28/2025) 30 tablet 0    ibuprofen (MOTRIN) 800 mg tablet Take 1 tablet (800 mg total) by mouth every 8 (eight) hours as needed for mild pain (Patient not taking: Reported on 3/28/2025) 30 tablet 0    lamoTRIgine (LaMICtal) 100 mg tablet Take 100 mg by mouth daily (Patient not taking: Reported on 3/28/2025)       No current facility-administered medications on file prior to visit.      Social History     Tobacco Use    Smoking status: Never    Smokeless tobacco: Never   Vaping Use    Vaping status: Former    Substances: THC   Substance and Sexual Activity    Alcohol use: Yes     Alcohol/week: 6.0 standard drinks of alcohol     Types: 6 Standard drinks or equivalent per week     Comment: Social    Drug use: Not Currently     Frequency: 7.0 times per week     Types: Marijuana     Comment: Medical marijuana     Sexual activity: Not Currently     Birth control/protection: I.U.D.     Comment: Per pt Mirena IUD inserted in 2015 or 16       Objective   /88 (BP Location: Left arm, Patient Position: Sitting, Cuff Size: Large)   Pulse 96   Temp 98.2 °F (36.8 °C) (Temporal)   Resp 16   Ht 5' 5\" (1.651 m)   Wt 114 kg (251 lb 8 oz)   SpO2 98%   BMI 41.85 kg/m²     Physical Exam  Vitals and nursing note reviewed.   Constitutional:       Appearance: Normal appearance. She is well-developed.   HENT:      Head: Normocephalic and atraumatic.   Cardiovascular:      Rate and Rhythm: Normal rate and regular rhythm.   Pulmonary:      Effort: Pulmonary effort is normal.      Breath sounds: Normal breath sounds.   Abdominal:      General: Bowel sounds are normal.      Palpations: Abdomen is soft.   Musculoskeletal:      Cervical back: Normal range of motion.   Skin:     " General: Skin is warm.   Neurological:      General: No focal deficit present.      Mental Status: She is alert.   Psychiatric:         Mood and Affect: Mood normal.         Speech: Speech normal.

## 2025-03-31 ENCOUNTER — OFFICE VISIT (OUTPATIENT)
Dept: OBGYN CLINIC | Facility: CLINIC | Age: 25
End: 2025-03-31

## 2025-03-31 VITALS — WEIGHT: 251.6 LBS | SYSTOLIC BLOOD PRESSURE: 118 MMHG | BODY MASS INDEX: 41.87 KG/M2 | DIASTOLIC BLOOD PRESSURE: 82 MMHG

## 2025-03-31 DIAGNOSIS — Z90.79 STATUS POST BILATERAL SALPINGECTOMY: Primary | ICD-10-CM

## 2025-03-31 PROCEDURE — 99024 POSTOP FOLLOW-UP VISIT: CPT | Performed by: STUDENT IN AN ORGANIZED HEALTH CARE EDUCATION/TRAINING PROGRAM

## 2025-03-31 NOTE — PROGRESS NOTES
Assessment/Plan:     Problem List Items Addressed This Visit          Surgery/Wound/Pain    Status post bilateral salpingectomy - Primary         Plan     1. Pathology reviewed  2. Wound care discussed  3. Activity restrictions: can gradually increase activity and weight lifted  4. Anticipated return to work: cleared for work  5. Follow up: routine     Subjective:      Patient ID: Nafisa Lubin is a 24 y.o. y.o. female.    HPI She presents today for postoperative follow up. She is feeling very well. Denies fevers, chills, nausea, vomiting, diarrhea, constipation. No difficulty with activity.     The following portions of the patient's history were reviewed and updated as appropriate: allergies, current medications, past family history, past medical history, past social history, past surgical history and problem list.    Review of Systems  as above    Objective:  Vitals:    03/31/25 0915   BP: 118/82        Physical Exam   Constitutional: She is oriented to person, place, and time. She appears well-developed and well-nourished.   HENT:   Head: Normocephalic.   Eyes: EOM are normal.   Neck: Normal range of motion.   Pulmonary/Chest: Effort normal.   Abdominal: Soft. She exhibits no distension and no mass. There is no tenderness. There is no rebound and no guarding.   Incisions well-healed x2  Musculoskeletal: Normal range of motion.   Neurological: She is alert and oriented to person, place, and time.   Skin: Skin is warm and dry.   Psychiatric: She has a normal mood and affect. Her behavior is normal.   Vitals reviewed.

## 2025-04-15 ENCOUNTER — TELEPHONE (OUTPATIENT)
Age: 25
End: 2025-04-15
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
## (undated) DEVICE — SPONGE 4 X 4 XRAY 16 PLY STRL LF RFD

## (undated) DEVICE — GLOVE PI ULTRA TOUCH SZ.6.5

## (undated) DEVICE — BETHLEHEM UNIVERSAL MINOR VAG: Brand: CARDINAL HEALTH

## (undated) DEVICE — TROCAR: Brand: KII SLEEVE

## (undated) DEVICE — TROCAR: Brand: KII FIOS FIRST ENTRY

## (undated) DEVICE — INSUFLATION TUBING INSUFLOW (LEXION)

## (undated) DEVICE — EXOFIN PRECISION PEN HIGH VISCOSITY TOPICAL SKIN ADHESIVE: Brand: EXOFIN PRECISION PEN, 1G

## (undated) DEVICE — INSUFFLATION NEEDLE TO ESTABLISH PNEUMOPERITONEUM.: Brand: INSUFFLATION NEEDLE

## (undated) DEVICE — PREMIUM DRY TRAY LF: Brand: MEDLINE INDUSTRIES, INC.

## (undated) DEVICE — BETHLEHEM UNIVERSAL GYN LAP PK: Brand: CARDINAL HEALTH

## (undated) DEVICE — GLOVE INDICATOR PI UNDERGLOVE SZ 6.5 BLUE

## (undated) DEVICE — SUT MONOCRYL 4-0 PS-2 27 IN Y426H

## (undated) DEVICE — CHLORAPREP HI-LITE 26ML ORANGE

## (undated) DEVICE — INTENDED FOR TISSUE SEPARATION, AND OTHER PROCEDURES THAT REQUIRE A SHARP SURGICAL BLADE TO PUNCTURE OR CUT.: Brand: BARD-PARKER SAFETY BLADES SIZE 11, STERILE

## (undated) DEVICE — HARMONIC 1100 SHEARS, 36CM SHAFT LENGTH: Brand: HARMONIC

## (undated) DEVICE — CHLORHEXIDINE 4PCT 4 OZ

## (undated) DEVICE — TOWEL SURG XR DETECT GREEN STRL RFD

## (undated) DEVICE — PVC URETHRAL CATHETER: Brand: DOVER

## (undated) DEVICE — TROCAR: Brand: KII OPTICAL ACCESS SYSTEM